# Patient Record
Sex: FEMALE | Race: BLACK OR AFRICAN AMERICAN | Employment: FULL TIME | URBAN - METROPOLITAN AREA
[De-identification: names, ages, dates, MRNs, and addresses within clinical notes are randomized per-mention and may not be internally consistent; named-entity substitution may affect disease eponyms.]

---

## 2017-01-11 ENCOUNTER — HOSPITAL ENCOUNTER (EMERGENCY)
Age: 57
Discharge: HOME | End: 2017-01-11
Attending: FAMILY MEDICINE

## 2017-01-11 VITALS
BODY MASS INDEX: 42.91 KG/M2 | SYSTOLIC BLOOD PRESSURE: 138 MMHG | HEART RATE: 85 BPM | DIASTOLIC BLOOD PRESSURE: 92 MMHG | WEIGHT: 227.3 LBS | HEIGHT: 61 IN | OXYGEN SATURATION: 97 % | RESPIRATION RATE: 16 BRPM | TEMPERATURE: 98.3 F

## 2017-01-11 DIAGNOSIS — J06.9 VIRAL URI WITH COUGH: Primary | ICD-10-CM

## 2017-01-11 RX ORDER — BENZONATATE 200 MG/1
200 CAPSULE ORAL
Qty: 21 CAP | Refills: 0 | Status: SHIPPED | OUTPATIENT
Start: 2017-01-11 | End: 2017-01-18

## 2017-01-11 RX ORDER — FLUTICASONE PROPIONATE 50 MCG
2 SPRAY, SUSPENSION (ML) NASAL DAILY
Qty: 1 BOTTLE | Refills: 0 | Status: SHIPPED | OUTPATIENT
Start: 2017-01-11 | End: 2017-05-02 | Stop reason: ALTCHOICE

## 2017-01-11 RX ORDER — BROMPHENIRAMINE MALEATE, PSEUDOEPHEDRINE HYDROCHLORIDE, AND DEXTROMETHORPHAN HYDROBROMIDE 2; 30; 10 MG/5ML; MG/5ML; MG/5ML
10 SYRUP ORAL
Qty: 240 ML | Refills: 0 | Status: SHIPPED | OUTPATIENT
Start: 2017-01-11 | End: 2017-05-02 | Stop reason: ALTCHOICE

## 2017-01-11 NOTE — DISCHARGE INSTRUCTIONS
Saline Nasal Washes: Care Instructions  Your Care Instructions  Saline nasal washes help keep the nasal passages open by washing out thick or dried mucus. This simple remedy can help relieve symptoms of allergies, sinusitis, and colds. It also can make the nose feel more comfortable by keeping the mucous membranes moist. You may notice a little burning sensation in your nose the first few times you use the solution, but this usually gets better in a few days. Follow-up care is a key part of your treatment and safety. Be sure to make and go to all appointments, and call your doctor if you are having problems. It's also a good idea to know your test results and keep a list of the medicines you take. How can you care for yourself at home? · You can buy premixed saline solution in a squeeze bottle or other sinus rinse products at a drugstore. Read and follow the instructions on the label. · You also can make your own saline solution by adding 1 teaspoon of salt and 1 teaspoon of baking soda to 2 cups of distilled water. · If you use a homemade solution, pour a small amount into a clean bowl. Using a rubber bulb syringe, squeeze the syringe and place the tip in the salt water. Pull a small amount of the salt water into the syringe by relaxing your hand. · Sit down with your head tilted slightly back. Do not lie down. Put the tip of the bulb syringe or the squeeze bottle a little way into one of your nostrils. Gently drip or squirt a few drops into the nostril. Repeat with the other nostril. Some sneezing and gagging are normal at first.  · Gently blow your nose. · Wipe the syringe or bottle tip clean after each use. · Repeat this 2 or 3 times a day. · Use nasal washes gently if you have nosebleeds often. When should you call for help? Watch closely for changes in your health, and be sure to contact your doctor if:  · You often get nosebleeds. · You have problems doing the nasal washes.   Where can you learn more? Go to http://evette-delta.info/. Enter [phone] in the search box to learn more about \"Saline Nasal Washes: Care Instructions. \"  Current as of: July 29, 2016  Content Version: 11.1  © 2282-2736 [employer], [employer]. Care instructions adapted under license by [employer] (which disclaims liability or warranty for this information). If you have questions about a medical condition or this instruction, always ask your healthcare professional. [address] any warranty or liability for your use of this information.

## 2017-01-11 NOTE — UC PROVIDER NOTE
Patient is a 62 y.o. female presenting with sinus pain. The history is provided by the patient. Sinus Pain    This is a new problem. The current episode started 2 days ago. The problem has not changed since onset. There has been no fever. Associated symptoms include congestion, sore throat and cough. Pertinent negatives include no chills. She has tried nothing for the symptoms.         Past Medical History   Diagnosis Date    Adrenal nodule ([address] 12/07/2014     NO TREATMENT NEEDED    Adverse effect of anesthesia 10/03/2016     \"TEND TO TAKE A LITTLE LONGER FOR EFFECT TO WEAR OFF\"    Adverse effect of anesthesia      NO KNOWN HISTORY OF ANESTHESIA PROBLEMS IN FAMILY    Arthritis     Bursitis of left hip     Chronic pain      HIP, KNEES    Common migraine 05/11/2010     PT DENIES THIS DIAGNOSIS ON 10/3/16    CRP elevated 5/11/2010    Diverticulosis of sigmoid 05/2010     HAS HAD 2-3 EPISODES OF DIVERTICULITIS, PT STATES ON 10/3/16    Eczema 5/11/2010    Elevated cholesterol 5/11/2010     elevated particle number    Family history of early CAD 5/11/2010    Fe deficiency anemia 5/11/2010    H/O mammogram 7/12/2012     [facility]    HTN (hypertension) 5/11/2010    Leukopenia     Vitamin D deficiency 8/6/2010        Past Surgical History   Procedure Laterality Date    Hx hysterectomy  8/98     ovaries spared    Endoscopy, colon, diagnostic  2/25/11    Hx cholecystectomy  11/01     laparoscopic    Hx hernia repair  6114     UMBILICAL    Hx orthopaedic Right 2003     BUNIONECTOMY    Hx orthopaedic Left 02/2016     TOTAL HIP         Family History   Problem Relation Age of Onset    Colon Cancer Paternal Grandmother     Cancer Paternal Grandmother     Colon Cancer Paternal Grandfather     Arthritis-osteo Mother     Diabetes Mother     Elevated Lipids Mother     Hypertension Mother     Diabetes Father     Arthritis-osteo Father     Anesth Problems Neg Hx         Social History Social History    Marital status:      Spouse name: N/A    Number of children: 2    Years of education: N/A     Occupational History    Clerical      Social History Main Topics    Smoking status: Never Smoker    Smokeless tobacco: Never Used    Alcohol use Yes      Comment: rare    Drug use: No    Sexual activity: Yes     Partners: Male     Birth control/ protection: None     Other Topics Concern    Not on file     Social History Narrative                ALLERGIES: Latex    Review of Systems   Constitutional: Negative for chills. HENT: Positive for congestion and sore throat. Respiratory: Positive for cough. Vitals:    01/11/17 1215   BP: (!) 138/92   Pulse: 85   Resp: 16   Temp: 98.3 °F (36.8 °C)   SpO2: 97%   Weight: 103.1 kg (227 lb 4.8 oz)   Height: 5' 1\" (1.549 m)       Physical Exam   Constitutional: No distress. HENT:   Right Ear: Tympanic membrane and ear canal normal.   Left Ear: Tympanic membrane and ear canal normal.   Nose: Nose normal.   Mouth/Throat: No oropharyngeal exudate, posterior oropharyngeal edema or posterior oropharyngeal erythema. Eyes: Conjunctivae are normal. Right eye exhibits no discharge. Left eye exhibits no discharge. Neck: Neck supple. Pulmonary/Chest: Effort normal and breath sounds normal. No respiratory distress. She has no wheezes. She has no rales. Lymphadenopathy:     She has no cervical adenopathy. Skin: No rash noted. Nursing note and vitals reviewed.       MDM     Differential Diagnosis; Clinical Impression; Plan:     CLINICAL IMPRESSION:  Viral URI with cough  (primary encounter diagnosis)      DDX    Plan:    Fluids/ gargles  Claritin/ allegra   Tylenol cold-sinus - max strength 1-2 tab 4 times/ day    with Advil as needed    bromfed DM/ flonase and tessalon  Amount and/or Complexity of Data Reviewed:    Review and summarize past medical records:  Yes  Risk of Significant Complications, Morbidity, and/or Mortality:   Presenting problems:  Low  Management options:  Low  Progress:   Patient progress:  Stable      Procedures

## 2017-01-12 ENCOUNTER — PATIENT OUTREACH (OUTPATIENT)
Dept: FAMILY MEDICINE CLINIC | Age: 57
End: 2017-01-12

## 2017-01-12 NOTE — PROGRESS NOTES
Per [facility], [facility] report, patient seen at [facility] 1/11/17 for viral URI with cough. Last ov 9/12/16. Letter sent to patient to f/u if no better and urged to schedule regular check ups for HM. NN contact information provided.

## 2017-01-12 NOTE — LETTER
January 12, 2017 [address] [facility] [address] [place] [id number] Dear Dejuan Paez: Thank you for requesting access to [email or web address]. Please follow the instructions below to securely access and download your online medical record. [email or web address] allows you to send messages to your doctor, view your test results, renew your prescriptions, schedule appointments, and more. How Do I Sign Up? 1. In your internet browser, go to [email or web address] 2. Click on the First Time User? Click Here link in the Sign In box. You will see the New Member Sign Up page. 3. Enter your [email or web address] Access Code exactly as it appears below. You will not need to use this code after youve completed the sign-up process. If you do not sign up before the expiration date, you must request a new code. [email or web address] Access Code: [email or web address] Expires: 4/11/2017 12:04 PM  
 
4. Enter the last four digits of your Social Security Number (xxxx) and Date of Birth (mm/dd/yyyy) as indicated and click Submit. You will be taken to the next sign-up page. 5. Create a [email or web address] ID. This will be your [email or web address] login ID and cannot be changed, so think of one that is secure and easy to remember. 6. Create a [email or web address] password. You can change your password at any time. 7. Enter your Password Reset Question and Answer. This can be used at a later time if you forget your password. 8. Enter your e-mail address. You will receive e-mail notification when new information is available in [address]. 9. Click Sign Up. You can now view and download portions of your medical record. 10. Click the Download Summary menu link to download a portable copy of your medical information. Additional Information If you have questions, please visit the Frequently Asked Questions section of the [email or web address] website at [email or web address] Remember, [email or web address] is NOT to be used for urgent needs. For medical emergencies, dial 911. Now available from your iPhone and Android! Sincerely, [employer]

## 2017-01-12 NOTE — LETTER
1/12/2017 8:05 AM 
 
[facility] [address] [address] Dear Eleonora Churchill, 
 
I am a Nurse Navigator working with your physician's office. Part of my job is to follow up with patients who have been in the emergency department,urgent care or hospital to see how they are feeling, answer any questions they may have about their visit and also make sure they have a follow-up appointment to see their primary care doctor. I see that you went to the [address] [place] [address] yesterday. I hope that you are feeling better by now. If not, please call and schedule an appointment for follow up with one of our providers. Also, please schedule routine check ups to make sure all of your Health Maintenance issues are up to date. I can also provide resources to patients that have other needs. Please call me if you need assistance with affording food, medications, or if you need transportation to physician appointments. Also, I have enclosed information about \"My Chart,\" which allows you to access your medical record from home. If you would be interested in this feature I can activate it for you. I can be reached Monday thru Friday at the telephone number listed above. Thank you for allowing us to participate in your care!  
 
 
 
 
Sincerely, 
 
 
Lavern Berry RN

## 2017-05-02 ENCOUNTER — OFFICE VISIT (OUTPATIENT)
Dept: FAMILY MEDICINE CLINIC | Age: 57
End: 2017-05-02

## 2017-05-02 VITALS
SYSTOLIC BLOOD PRESSURE: 140 MMHG | HEART RATE: 77 BPM | DIASTOLIC BLOOD PRESSURE: 82 MMHG | WEIGHT: 225.8 LBS | RESPIRATION RATE: 18 BRPM | BODY MASS INDEX: 42.63 KG/M2 | HEIGHT: 61 IN | TEMPERATURE: 97.7 F | OXYGEN SATURATION: 97 %

## 2017-05-02 DIAGNOSIS — E55.9 VITAMIN D DEFICIENCY: ICD-10-CM

## 2017-05-02 DIAGNOSIS — Z00.00 PHYSICAL EXAM, ANNUAL: Primary | ICD-10-CM

## 2017-05-02 DIAGNOSIS — E78.00 ELEVATED CHOLESTEROL: ICD-10-CM

## 2017-05-02 DIAGNOSIS — E11.9 TYPE 2 DIABETES MELLITUS WITHOUT COMPLICATION, WITHOUT LONG-TERM CURRENT USE OF INSULIN (HCC): ICD-10-CM

## 2017-05-02 DIAGNOSIS — Z11.59 NEED FOR HEPATITIS C SCREENING TEST: ICD-10-CM

## 2017-05-02 DIAGNOSIS — S72.92XD CLOSED FRACTURE OF LEFT FEMUR WITH ROUTINE HEALING, UNSPECIFIED FRACTURE MORPHOLOGY, UNSPECIFIED PORTION OF FEMUR, SUBSEQUENT ENCOUNTER: ICD-10-CM

## 2017-05-02 RX ORDER — METFORMIN HYDROCHLORIDE 500 MG/1
500 TABLET ORAL 2 TIMES DAILY WITH MEALS
Qty: 30 TAB | Refills: 6 | Status: SHIPPED | OUTPATIENT
Start: 2017-05-02 | End: 2017-05-03

## 2017-05-02 NOTE — PATIENT INSTRUCTIONS
1) Healthy BMI is between 20% -24.9%. This is a mathematical calculation based on your height and weight. Your BMI is 42    In terms of weight loss, a 5-10% reduction in body weight over 3-6 months is a reasonable goal.  There would likely be improvements in risk for disease such as diabetes and heart disease, with this amount of weight loss. Reduce your daily intake of calories by 500 with a combination of decrease portions of food and increase exercise to help reduce weight. The recommended serving size for protein is a 2-3 oz serving (the size of your fist), and 1-1.5 oz of carbohydrate per meal (about 1 cup). Increase servings of fruits and vegetables. Drink plenty of water. Choose lean meats for protein source which include chicken, pork, and turkey. Limit processed carbohydrates, (i.e. most breads, crackers, pasta, chips, rice, breaded or battered food, etc). Whole wheat, (instead of white) is a healthier option for bread, rice, and crackers. Limit sugar and alcohol. Daily exercise will also help with weight loss and overall health. A minimum of 150 minutes a week of moderate exercise is recommended. Weight loss will help with diabetes control and reduce stress on joints. You may wish to consider seeing the nutritionist at [facility] ([phone]), [facility] ([phone]) or [place] ([phone]). For reliable dietary information, go to www. EATRIGHT.org.    Free smart phone application to help manage weight loss: MyFitnessPal = tracks food intake, exercise and weight. Daily nutritional summary. Meal      2) Metformin    You have been prescribed metformin to help with your blood sugar levels. The most common side effect from metformin is gastrointestinal symptoms (such as diarrhea, belly pain, nausea). To minimize side effects, please take with food. Metformin titration:  Begin Metformin 500 mg at dinner and take for 1 week.   If no GI side effects (nausea, diarrhea, belly pain), go up to 500 mg with breakfast and dinner for 1 week. 3rd week: take 1pills (500 mg) in the morning and take 2 pill (500 mg)at night  4th week: take 2 pills (500 mg) in the morning and take 2 pills (500 mg)at night    3) Try using compression hose to help with feet swelling.

## 2017-05-02 NOTE — MR AVS SNAPSHOT
Visit Information Date & Time Provider Department Dept. Phone Encounter #  
 5/2/2017  9:00 AM Graham Aragon  [facility] [address] [phone] [id number] Upcoming Health Maintenance Date Due Hepatitis C Screening 1960 Pneumococcal 19-64 Highest Risk (1 of 3 - PCV13) 1/2/1979 PAP AKA CERVICAL CYTOLOGY 8/20/2017 BREAST CANCER SCRN MAMMOGRAM 8/25/2017 INFLUENZA AGE 9 TO ADULT 8/1/2017 COLONOSCOPY 2/25/2021 DTaP/Tdap/Td series (2 - Td) 4/17/2024 Allergies as of 5/2/2017  Review Complete On: 5/2/2017 By: Graham Aragon NP Severity Noted Reaction Type Reactions Latex  04/17/2014   Systemic Itching ITCHING, SWELLING Current Immunizations  Reviewed on 10/25/2016 Name Date Influenza Vaccine (Quad) PF 10/26/2016  2:07 PM  
 Influenza Vaccine Split 11/9/2010 Tdap 4/17/2014 Not reviewed this visit You Were Diagnosed With   
  
 Codes Comments Physical exam, annual    -  Primary ICD-10-CM: Z00.00 ICD-9-CM: V70.0 Elevated cholesterol     ICD-10-CM: E78.00 ICD-9-CM: 272.0 Vitamin D deficiency     ICD-10-CM: E55.9 ICD-9-CM: 268.9 Need for hepatitis C screening test     ICD-10-CM: Z11.59 
ICD-9-CM: V73.89 Hip fracture requiring operative repair, left, closed, initial encounter     ICD-10-CM: S72.002A ICD-9-CM: 820.8 Vitals BP Pulse Temp Resp Height(growth percentile) Weight(growth percentile) 140/82 (BP 1 Location: Left arm, BP Patient Position: Sitting) 77 97.7 °F (36.5 °C) (Oral) 18 5' 1\" (1.549 m) 225 lb 12.8 oz (102.4 kg) SpO2 BMI OB Status Smoking Status 97% 42.66 kg/m2 Hysterectomy Never Smoker BMI and BSA Data Body Mass Index Body Surface Area  
 42.66 kg/m 2 2.1 m 2 Preferred Pharmacy Pharmacy Name Phone [id number] [address] [phone] Your Updated Medication List  
 This list is accurate as of: 5/2/17 10:11 AM.  Always use your most recent med list.  
  
  
  
  
 acetaminophen 500 mg tablet Commonly known as:  TYLENOL Take 2 Tabs by mouth every six (6) hours. atorvastatin 40 mg tablet Commonly known as:  LIPITOR  
TAKE 1 TAB BY MOUTH DAILY. Brompheniramine-Pseudoeph-DM 2-30-10 mg/5 mL syrup Commonly known as:  BROMFED DM Take 10 mL by mouth four (4) times daily as needed. cholecalciferol 1,000 unit Cap Commonly known as:  VITAMIN D3 Take  by mouth. diclofenac EC 75 mg EC tablet Commonly known as:  VOLTAREN Take  by mouth two (2) times a day. ferrous sulfate 325 mg (65 mg iron) tablet Take 325 mg by mouth Daily (before breakfast). fluticasone 50 mcg/actuation nasal spray Commonly known as:  Skipper Or 2 Sprays by Both Nostrils route daily. lisinopril-hydroCHLOROthiazide 20-12.5 mg per tablet Commonly known as:  PRINZIDE, ZESTORETIC  
TAKE 1 TABLET BY MOUTH EVERY DAY  
  
 MULTI-VITAMIN PO Take 1 Tab by mouth daily. ONE A DAY VITAMIN We Performed the Following AMB POC EKG ROUTINE W/ 12 LEADS, INTER & REP [89955 CPT(R)] AMB POC HEMOGLOBIN A1C [42630 CPT(R)] CBC W/O DIFF [28865 CPT(R)] HEPATITIS C AB [28303 CPT(R)] LIPID PANEL [18712 CPT(R)] METABOLIC PANEL, COMPREHENSIVE [11718 CPT(R)] TSH 3RD GENERATION [29902 CPT(R)] VITAMIN D, 25 HYDROXY [id number] CPT(R)] To-Do List   
 08/02/2017 Imaging:  DEXA BONE DENSITY STUDY AXIAL Referral Information Referral ID Referred By Referred To  
  
 [id number] Paula VALENZUELA Not Available Visits Status Start Date End Date 1 New Request 5/2/17 5/2/18 If your referral has a status of pending review or denied, additional information will be sent to support the outcome of this decision. Patient Instructions 1) Healthy BMI is between 20% -24.9%.   This is a mathematical calculation based on your height and weight. Your BMI is 42 In terms of weight loss, a 5-10% reduction in body weight over 3-6 months is a reasonable goal.  There would likely be improvements in risk for disease such as diabetes and heart disease, with this amount of weight loss. Reduce your daily intake of calories by 500 with a combination of decrease portions of food and increase exercise to help reduce weight. The recommended serving size for protein is a 2-3 oz serving (the size of your fist), and 1-1.5 oz of carbohydrate per meal (about 1 cup). Increase servings of fruits and vegetables. Drink plenty of water. Choose lean meats for protein source which include chicken, pork, and turkey. Limit processed carbohydrates, (i.e. most breads, crackers, pasta, chips, rice, breaded or battered food, etc). Whole wheat, (instead of white) is a healthier option for bread, rice, and crackers. Limit sugar and alcohol. Daily exercise will also help with weight loss and overall health. A minimum of 150 minutes a week of moderate exercise is recommended. Weight loss will help with diabetes control and reduce stress on joints. You may wish to consider seeing the nutritionist at [facility] ([phone]), [facility] ([phone]) or [place] ([phone]). For reliable dietary information, go to www. EATRIGHT.org. 
 
Free smart phone application to help manage weight loss: MyFitnessPal = tracks food intake, exercise and weight. Daily nutritional summary. Meal  2) Metformin You have been prescribed metformin to help with your blood sugar levels. The most common side effect from metformin is gastrointestinal symptoms (such as diarrhea, belly pain, nausea). To minimize side effects, please take with food. Metformin titration: 
Begin Metformin 500 mg at dinner and take for 1 week.   If no GI side effects (nausea, diarrhea, belly pain), go up to 500 mg with breakfast and dinner for 1 week. 3rd week: take 1pills (500 mg) in the morning and take 2 pill (500 mg)at night 
4th week: take 2 pills (500 mg) in the morning and take 2 pills (500 mg)at night 3) Try using compression hose to help with feet swelling. Introducing [facility]! [employer] introduces Impactia patient portal. Now you can access parts of your medical record, email your doctor's office, and request medication refills online. 1. In your internet browser, go to [email or web address] 2. Click on the First Time User? Click Here link in the Sign In box. You will see the New Member Sign Up page. 3. Enter your Impactia Access Code exactly as it appears below. You will not need to use this code after youve completed the sign-up process. If you do not sign up before the expiration date, you must request a new code. · Impactia Access Code: [email or web address] Expires: 7/31/2017 10:01 AM 
 
4. Enter the last four digits of your Social Security Number (xxxx) and Date of Birth (mm/dd/yyyy) as indicated and click Submit. You will be taken to the next sign-up page. 5. Create a Impactia ID. This will be your Impactia login ID and cannot be changed, so think of one that is secure and easy to remember. 6. Create a Impactia password. You can change your password at any time. 7. Enter your Password Reset Question and Answer. This can be used at a later time if you forget your password. 8. Enter your e-mail address. You will receive e-mail notification when new information is available in [address] 9. Click Sign Up. You can now view and download portions of your medical record. 10. Click the Download Summary menu link to download a portable copy of your medical information.  
 
If you have questions, please visit the Frequently Asked Questions section of the MideoMe. Remember, fitmobhart is NOT to be used for urgent needs. For medical emergencies, dial 911. Now available from your iPhone and Android! Please provide this summary of care documentation to your next provider. Your primary care clinician is listed as Danielle Crawford. If you have any questions after today's visit, please call [phone].

## 2017-05-02 NOTE — PROGRESS NOTES
1. Have you been to the ER, urgent care clinic since your last visit? Hospitalized since your last visit? No    2. Have you seen or consulted any other health care providers outside of the [address] [place] [address] since your last visit? Include any pap smears or colon screening.  No     Chief Complaint   Patient presents with    Complete Physical     patient is here for annual exam      Learning Assessment 2/12/2013   PRIMARY LEARNER Patient   HIGHEST LEVEL OF EDUCATION - PRIMARY LEARNER  GRADUATED HIGH SCHOOL OR GED   PRIMARY LANGUAGE ENGLISH   LEARNER PREFERENCE PRIMARY DEMONSTRATION     LISTENING   ANSWERED BY patient   RELATIONSHIP SELF

## 2017-05-02 NOTE — PROGRESS NOTES
CCP: Complete Physical    S: Anirudh Judge is a 62 y.o. female who presents for complete physical    HPI:  2/2016 - left hip replacement; femur fractured after surgery and had to have a metal mina put into femur; has numbness still on upper left leg  10/2016 - right knee (full) replacement - went ok  She does have aches and pains in knee and hip. She walks a lot at work and at end of day she is sore. No other issues concerning pt today    Anirudh Judge has lost 2lbs since last visit on 1/11/17. Social history:   Nutrition: relatively healthy - has not had any diabetes counseling  breakfast - nothing  Lunch - left overs from dinner - meat and veggie, starch, sometimes fruit  Dinner: meat, veggie, starch, dessert - ice cream (favorite) cookie, cake, pie  Drinks: Pepsi, water  Physical: none - will try and walk with  daily  Social: lives with , step-son   Occupation: [employer] -   Tobacco: non-smoker  Drug: none  Alcohol: occasionally wine  Sexually Active: yes with ;     Review of Systems:  - Constitutional Symptoms: no fevers or chills  - Eyes: no blurry vision or double vision  - Cardiovascular: no chest pain or palpitations  - Respiratory: no cough or shortness of breath  - Gastrointestinal: no dysphagia or abdominal pain  - Musculoskeletal: + joint pains (R knee, Left hip), no weakness  - Integumentary: no rashes  - : no vaginal discharge, itching, dyspareunia. PAP at gyn  - Neurological: no numbness, tingling, or headaches  - Psychiatric: no depression or anxiety  - Endocrine:  no heat or cold intolerance, no polyuria or polydipsia    No LMP recorded. Patient has had a hysterectomy.   1998       Past Medical History:   Diagnosis Date    Adrenal nodule ([facility] [address] 12/07/2014    NO TREATMENT NEEDED    Adverse effect of anesthesia 10/03/2016    \"TEND TO TAKE A LITTLE LONGER FOR EFFECT TO WEAR OFF\"    Adverse effect of anesthesia     NO KNOWN HISTORY OF ANESTHESIA PROBLEMS IN FAMILY    Arthritis     Bursitis of left hip     Chronic pain     HIP, KNEES    Common migraine 05/11/2010    PT DENIES THIS DIAGNOSIS ON 10/3/16    CRP elevated 5/11/2010    Diverticulosis of sigmoid 05/2010    HAS HAD 2-3 EPISODES OF DIVERTICULITIS, PT STATES ON 10/3/16    Eczema 5/11/2010    Elevated cholesterol 5/11/2010    elevated particle number    Family history of early CAD 5/11/2010    Fe deficiency anemia 5/11/2010    H/O mamm[facility] 7/12/2012    [facility]    HTN (hypertension) 5/11/2010    Leukopenia     Vitamin D deficiency 8/6/2010       Current Outpatient Prescriptions   Medication Sig Dispense Refill    lisinopril-hydrochlorothiazide (PRINZIDE, ZESTORETIC) 20-12.5 mg per tablet TAKE 1 TABLET BY MOUTH EVERY DAY 90 Tab 2    atorvastatin (LIPITOR) 40 mg tablet TAKE 1 TAB BY MOUTH DAILY. 90 Tab 3    ferrous sulfate 325 mg (65 mg iron) tablet Take 325 mg by mouth Daily (before breakfast).  MULTI-VITAMIN PO Take 1 Tab by mouth daily. ONE A DAY VITAMIN      Brompheniramine-Pseudoeph-DM (BROMFED DM) 2-30-10 mg/5 mL syrup Take 10 mL by mouth four (4) times daily as needed. 240 mL 0    fluticasone (FLONASE) 50 mcg/actuation nasal spray 2 Sprays by Both Nostrils route daily. 1 Bottle 0    acetaminophen (TYLENOL) 500 mg tablet Take 2 Tabs by mouth every six (6) hours. 90 Tab 0    diclofenac EC (VOLTAREN) 75 mg EC tablet Take  by mouth two (2) times a day.  Cholecalciferol, Vitamin D3, 1,000 unit cap Take  by mouth. Pt is taking all medications as prescribed without any side effects or difficulty.     Allergies   Allergen Reactions    Latex Itching     ITCHING, SWELLING        Health Maintenance:  Colonoscopy: 2/25/2011 - 10 year recall  Mammogram: 8/3/2016  PAP: 8/20/2014 - will schedule this year  DEXA: ordered  Eye exam: UTD 2016 - wears reading glasses  Tdap: 4/17/2014  Flu: 10/2016    O: VS:   Visit Vitals    /82 (BP 1 Location: Left arm, BP Patient Position: Sitting)    Pulse 77    Temp 97.7 °F (36.5 °C) (Oral)    Resp 18    Ht 5' 1\" (1.549 m)    Wt 225 lb 12.8 oz (102.4 kg)    SpO2 97%    BMI 42.66 kg/m2       Data Reviewed:  Labs:   TSH: 3.24 (2014)  Cholesterol: 145 (6/12/15)   Tri: 123   HDL: 53   LDL: 67  HbA1C: 6.5 % (10/3/16)  Vitamin D: 20 ()    EK2016 - Sinus rhythm with fusion complexes   Minimal voltage criteria for LVH, may be normal variant     GENERAL: Edy Conley is sitting on exam table in no acute distress. Non-toxic. Well nourished. Well developed. Appropriately groomed. HEAD:  Normocephalic. Atraumatic. Non tender sinuses x 4. EYE: PERRLA. EOMs intact. Sclera anicteric without injection. No drainage or discharge. EARS: Hearing intact bilaterally. External ear canals normal without evidence of blood or swelling. Right ear has cerumen. Bilateral TM's intact, pearly grey with landmarks visible. No erythema or effusion. NOSE: Patent. Nasal turbinates pink. No polyps noted. No erythema. No discharge. MOUTH: mucous membranes pink and moist. Posterior pharynx normal with cobblestone appearance. No erythema, white exudate or obstruction. NECK: supple. Midline trachea. No thyromegaly noted. RESP: Breath sounds are symmetrical bilaterally. Unlabored without SOB. Speaking in full sentences. Clear to auscultation bilaterally anteriorly and posteriorly. No wheezes. No rales or rhonchi. CV: normal rate. Regular rhythm. S1, S2 audible. No murmur noted. No rubs, clicks or gallops noted. ABDOMEN: Round without bulges or pulsations. Soft and nondistended. No tenderness on palpation. No masses or organomegaly. No rebound, rigidity or guarding. Bowel sounds normal x 4 quadrants. BACK: No visible deformities or curvature. Full ROM. No pain on palpation of the spinous processes in the cervical, thoracic, lumbar, sacral regions. No CVA tenderness.    NEURO:  awake, alert and oriented to person, place, and time and event. Cranial nerves II through XII intact. Clear speech. Sensation is intact to light touch bilaterally. Steady gait. MUSC:  Intact x 4 extremities. Full ROM x 4 extremities. Muscle strength is +5/5  upper extremities; +4/5 lower extremities. Slight edema bilateral feet  HEME/LYMPH: peripheral pulses palpable 2+ x 4 extremities. No peripheral edema is noted. No cervical adenopathy noted. SKIN: Skin is warm and dry. Turgor is normal. No petechiae, no purpura, no rash. No cyanosis. No mottling, jaundice or pallor. PSYCH: appropriate behavior, dress and thought processes. Good eye contact. Clear and coherent speech. Full affect. Good insight. Assessment and Plan:  1. Physical exam, annual  Discussed diet and weight loss, including dietician consult. - AMB POC EKG ROUTINE W/ 12 LEADS, INTER & REP  - CBC W/O DIFF  - LIPID PANEL  - METABOLIC PANEL, COMPREHENSIVE  - TSH 3RD GENERATION  - AMB POC HEMOGLOBIN A1C  - DEXA BONE DENSITY STUDY AXIAL; Future    2. Elevated cholesterol  Currently stable with medication. Medication was maintained, pending lab results. 3. Vitamin D deficiency  Pt takes multivitamin. Discussed Calcium and Vit D supplements, pending lab results  - VITAMIN D, 25 HYDROXY    4. Need for hepatitis C screening test  - HEPATITIS C AB    5. Closed fracture of left femur with routine healing, unspecified fracture morphology, unspecified portion of femur, subsequent encounter  Pt had left hip replacement 2/2016 and subsequent femur fracture. Will check bone density.  - DEXA BONE DENSITY STUDY AXIAL; Future    6. Type II Diabetes Mellitus  A1C of 6.5% (10/3/2016), up from [facility] of 6.4% on 2/4/2016. Will start metformin 500 mg daily to help with BS control and weight loss, may titrate up depending on results from today's labs. Patient education was done. Advised on nutrition, physical activity, weight management, tobacco, alcohol and safety.   Counseling included discussion of diagnosis, differentials, treatment options, prescribed treatment, warning signs and follow up. Medication risks/benefits,interactions and alternatives discussed with patient.      Patient verbalized understanding and agreed to plan of care. Patient was given an after visit summary which included current diagnoses, medications and vital signs. Follow up in 3 months    Patient Instructions   1) Healthy BMI is between 20% -24.9%. This is a mathematical calculation based on your height and weight. Your BMI is 42    In terms of weight loss, a 5-10% reduction in body weight over 3-6 months is a reasonable goal.  There would likely be improvements in risk for disease such as diabetes and heart disease, with this amount of weight loss. Reduce your daily intake of calories by 500 with a combination of decrease portions of food and increase exercise to help reduce weight. The recommended serving size for protein is a 2-3 oz serving (the size of your fist), and 1-1.5 oz of carbohydrate per meal (about 1 cup). Increase servings of fruits and vegetables. Drink plenty of water. Choose lean meats for protein source which include chicken, pork, and turkey. Limit processed carbohydrates, (i.e. most breads, crackers, pasta, chips, rice, breaded or battered food, etc). Whole wheat, (instead of white) is a healthier option for bread, rice, and crackers. Limit sugar and alcohol. Daily exercise will also help with weight loss and overall health. A minimum of 150 minutes a week of moderate exercise is recommended. Weight loss will help with diabetes control and reduce stress on joints. You may wish to consider seeing the nutritionist at [facility] ([phone]), [facility] ([phone]) or [facility] ([phone]). For reliable dietary information, go to www. EATRIGHT.org.    Free smart phone application to help manage weight loss:   MyFitnessPal = tracks food intake, exercise and weight. Daily nutritional summary. Meal      2) Metformin    You have been prescribed metformin to help with your blood sugar levels. The most common side effect from metformin is gastrointestinal symptoms (such as diarrhea, belly pain, nausea). To minimize side effects, please take with food. Metformin titration:  Begin Metformin 500 mg at dinner and take for 1 week. If no GI side effects (nausea, diarrhea, belly pain), go up to 500 mg with breakfast and dinner for 1 week. 3rd week: take 1pills (500 mg) in the morning and take 2 pill (500 mg)at night  4th week: take 2 pills (500 mg) in the morning and take 2 pills (500 mg)at night    3) Try using compression hose to help with feet swelling.

## 2017-05-03 LAB
25(OH)D3+25(OH)D2 SERPL-MCNC: 22.5 NG/ML (ref 30–100)
ALBUMIN SERPL-MCNC: 4 G/DL (ref 3.5–5.5)
ALBUMIN/GLOB SERPL: 1.3 {RATIO} (ref 1.2–2.2)
ALP SERPL-CCNC: 96 IU/L (ref 39–117)
ALT SERPL-CCNC: 23 IU/L (ref 0–32)
AST SERPL-CCNC: 22 IU/L (ref 0–40)
BILIRUB SERPL-MCNC: 0.4 MG/DL (ref 0–1.2)
BUN SERPL-MCNC: 11 MG/DL (ref 6–24)
BUN/CREAT SERPL: 19 (ref 9–23)
CALCIUM SERPL-MCNC: 8.9 MG/DL (ref 8.7–10.2)
CHLORIDE SERPL-SCNC: 100 MMOL/L (ref 96–106)
CHOLEST SERPL-MCNC: 142 MG/DL (ref 100–199)
CO2 SERPL-SCNC: 22 MMOL/L (ref 18–29)
CREAT SERPL-MCNC: 0.59 MG/DL (ref 0.57–1)
ERYTHROCYTE [DISTWIDTH] IN BLOOD BY AUTOMATED COUNT: 15 % (ref 12.3–15.4)
EST. AVERAGE GLUCOSE BLD GHB EST-MCNC: 137 MG/DL
GLOBULIN SER CALC-MCNC: 3.2 G/DL (ref 1.5–4.5)
GLUCOSE SERPL-MCNC: 110 MG/DL (ref 65–99)
HBA1C MFR BLD: 6.4 % (ref 4.8–5.6)
HCT VFR BLD AUTO: 36.7 % (ref 34–46.6)
HCV AB S/CO SERPL IA: <0.1 S/CO RATIO (ref 0–0.9)
HDLC SERPL-MCNC: 52 MG/DL
HGB BLD-MCNC: 12.4 G/DL (ref 11.1–15.9)
INTERPRETATION, 910389: NORMAL
LDLC SERPL CALC-MCNC: 68 MG/DL (ref 0–99)
MCH RBC QN AUTO: 28.6 PG (ref 26.6–33)
MCHC RBC AUTO-ENTMCNC: 33.8 G/DL (ref 31.5–35.7)
MCV RBC AUTO: 85 FL (ref 79–97)
PLATELET # BLD AUTO: 232 X10E3/UL (ref 150–379)
POTASSIUM SERPL-SCNC: 3.7 MMOL/L (ref 3.5–5.2)
PROT SERPL-MCNC: 7.2 G/DL (ref 6–8.5)
RBC # BLD AUTO: 4.34 X10E6/UL (ref 3.77–5.28)
SODIUM SERPL-SCNC: 140 MMOL/L (ref 134–144)
TRIGL SERPL-MCNC: 111 MG/DL (ref 0–149)
TSH SERPL DL<=0.005 MIU/L-ACNC: 2.18 UIU/ML (ref 0.45–4.5)
VLDLC SERPL CALC-MCNC: 22 MG/DL (ref 5–40)
WBC # BLD AUTO: 3.4 X10E3/UL (ref 3.4–10.8)

## 2017-05-25 ENCOUNTER — HOSPITAL ENCOUNTER (OUTPATIENT)
Dept: MAMMOGRAPHY | Age: 57
Discharge: HOME | End: 2017-05-25
Payer: COMMERCIAL

## 2017-05-25 DIAGNOSIS — S72.92XD CLOSED FRACTURE OF LEFT FEMUR WITH ROUTINE HEALING, UNSPECIFIED FRACTURE MORPHOLOGY, UNSPECIFIED PORTION OF FEMUR, SUBSEQUENT ENCOUNTER: ICD-10-CM

## 2017-05-25 DIAGNOSIS — Z00.00 PHYSICAL EXAM, ANNUAL: ICD-10-CM

## 2017-05-25 PROCEDURE — 77080 DXA BONE DENSITY AXIAL: CPT

## 2017-09-05 RX ORDER — LISINOPRIL AND HYDROCHLOROTHIAZIDE 12.5; 2 MG/1; MG/1
TABLET ORAL
Qty: 90 TAB | Refills: 2 | Status: SHIPPED | OUTPATIENT
Start: 2017-09-05 | End: 2017-11-29 | Stop reason: SDUPTHER

## 2017-10-16 RX ORDER — ATORVASTATIN CALCIUM 40 MG/1
TABLET, FILM COATED ORAL
Qty: 90 TAB | Refills: 0 | Status: SHIPPED | OUTPATIENT
Start: 2017-10-16 | End: 2018-01-13 | Stop reason: SDUPTHER

## 2017-11-29 ENCOUNTER — OFFICE VISIT (OUTPATIENT)
Dept: FAMILY MEDICINE CLINIC | Age: 57
End: 2017-11-29

## 2017-11-29 VITALS
SYSTOLIC BLOOD PRESSURE: 138 MMHG | BODY MASS INDEX: 43.27 KG/M2 | DIASTOLIC BLOOD PRESSURE: 89 MMHG | RESPIRATION RATE: 18 BRPM | HEART RATE: 68 BPM | WEIGHT: 229.2 LBS | HEIGHT: 61 IN | TEMPERATURE: 98 F | OXYGEN SATURATION: 98 %

## 2017-11-29 DIAGNOSIS — R73.02 GLUCOSE INTOLERANCE (IMPAIRED GLUCOSE TOLERANCE): ICD-10-CM

## 2017-11-29 DIAGNOSIS — E78.00 ELEVATED CHOLESTEROL: Primary | ICD-10-CM

## 2017-11-29 DIAGNOSIS — E66.01 OBESITY, MORBID (HCC): ICD-10-CM

## 2017-11-29 DIAGNOSIS — I10 HTN (HYPERTENSION), BENIGN: ICD-10-CM

## 2017-11-29 DIAGNOSIS — M25.842 CYST OF JOINT OF HAND, LEFT: ICD-10-CM

## 2017-11-29 DIAGNOSIS — Z23 ENCOUNTER FOR IMMUNIZATION: ICD-10-CM

## 2017-11-29 RX ORDER — LISINOPRIL AND HYDROCHLOROTHIAZIDE 12.5; 2 MG/1; MG/1
1 TABLET ORAL DAILY
Qty: 90 TAB | Refills: 1 | Status: SHIPPED | OUTPATIENT
Start: 2017-11-29 | End: 2019-02-14 | Stop reason: SDUPTHER

## 2017-11-29 NOTE — PATIENT INSTRUCTIONS

## 2017-11-29 NOTE — PROGRESS NOTES
1. Have you been to the ER, urgent care clinic since your last visit? Hospitalized since your last visit? No    2. Have you seen or consulted any other health care providers outside of the [address] [place] [address] since your last visit? Include any pap smears or colon screening. No     Chief Complaint   Patient presents with    Medication Evaluation     vicenten here for follow up for lisinopril    Finger Swelling     patient has a bump to pinky finger onleft hand,some discomfort       Learning assessment complete  Abuse Screening Questionnaire 11/29/2017   Do you ever feel afraid of your partner? N   Are you in a relationship with someone who physically or mentally threatens you? N   Is it safe for you to go home? Y     Fall Risk Assessment, last 12 mths 11/29/2017   Able to walk? Yes   Fall in past 12 months?  No

## 2017-11-29 NOTE — PROGRESS NOTES
HISTORY OF PRESENT ILLNESS  Brittni Cheney is a 62 y.o. female. HPI:Cardiovascular Review  The patient has, hypertension and hyperlipidemia and is prediabetic. She reports taking medications as instructed, no medication side effects noted. Diet and Lifestyle: not attempting to follow a low fat, low cholesterol diet, does not rigorously follow a diabetic diet, no formal exercise but active during the day. Lab review: labs reviewed and discussed with patient. Morbid obesity: she has morbid obesity,she is not able to exercise due to hip and knee pain, she had hip replacement surgery. 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 It is not painful, but it irritates her at times. she is due for flu shot.     Past Medical History:   Diagnosis Date    Adrenal nodule ([address] 12/07/2014    NO TREATMENT NEEDED    Adverse effect of anesthesia 10/03/2016    \"TEND TO TAKE A LITTLE LONGER FOR EFFECT TO WEAR OFF\"    Adverse effect of anesthesia     NO KNOWN HISTORY OF ANESTHESIA PROBLEMS IN FAMILY    Arthritis     Bursitis of left hip     Chronic pain     HIP, KNEES    Common migraine 05/11/2010    PT DENIES THIS DIAGNOSIS ON 10/3/16    CRP elevated 5/11/2010    Diverticulosis of sigmoid 05/2010    HAS HAD 2-3 EPISODES OF DIVERTICULITIS, PT STATES ON 10/3/16    Eczema 5/11/2010    Elevated cholesterol 5/11/2010    elevated particle number    Family history of early CAD 5/11/2010    Fe deficiency anemia 5/11/2010    H/O mammogram 7/12/2012    [facility]    HTN (hypertension) 5/11/2010    Leukopenia     Vitamin D deficiency 8/6/2010     Past Surgical History:   Procedure Laterality Date    ENDOSCOPY, COLON, DIAGNOSTIC  2/25/11    HX ACL RECONSTRUCTION Right 10/2016    HX CHOLECYSTECTOMY  11/01    laparoscopic    HX HERNIA REPAIR  9907    UMBILICAL    HX HYSTERECTOMY  8/98    ovaries spared    HX ORTHOPAEDIC Right 2003    BUNIONECTOMY    HX ORTHOPAEDIC Left 02/2016    TOTAL HIP     Allergies   Allergen Reactions    Latex Itching     ITCHING, SWELLING       Current Outpatient Prescriptions:     lisinopril-hydroCHLOROthiazide (PRINZIDE, ZESTORETIC) 20-12.5 mg per tablet, Take 1 Tab by mouth daily. , Disp: 90 Tab, Rfl: 1    atorvastatin (LIPITOR) 40 mg tablet, TAKE 1 TAB BY MOUTH DAILY. , Disp: 90 Tab, Rfl: 0    ferrous sulfate 325 mg (65 mg iron) tablet, Take 325 mg by mouth Daily (before breakfast). , Disp: , Rfl:     MULTI-VITAMIN PO, Take 1 Tab by mouth daily. ONE A DAY VITAMIN, Disp: , Rfl:   Review of Systems   Constitutional: Negative. Respiratory: Negative. Cardiovascular: Negative. Gastrointestinal: Negative. Blood pressure 138/89, pulse 68, temperature 98 °F (36.7 °C), temperature source Oral, resp. rate 18, height 5' 1\" (1.549 m), weight 229 lb 3.2 oz (104 kg), SpO2 98 %. Body mass index is 43.31 kg/(m^2). Physical Exam   Constitutional: No distress. HENT:   Mouth/Throat: Oropharynx is clear and moist.   Neck: Normal range of motion. Neck supple. Cardiovascular: Normal rate and regular rhythm. No murmur heard. Pulmonary/Chest: Effort normal and breath sounds normal.   Abdominal: Soft. Bowel sounds are normal.   Musculoskeletal:   Cyst in left 5th finger   Nursing note and vitals reviewed. ASSESSMENT and PLAN  Diagnoses and all orders for this visit:    1. Elevated cholesterol  -     LIPID PANEL    2. Encounter for immunization  -     Influenza virus vaccine (QUADRIVALENT PRES FREE SYRINGE) IM (55258)    3. Obesity, morbid ([address]          -     REFERRAL TO DIETITIAN   - Normal BMI discussed with the patient  - Advised to to try to walk and upper body exercises  - advised to eat more vegetables, cut back on carbs and fat   - portion control    4.  HTN (hypertension), benign  -     CBC W/O DIFF  -     METABOLIC PANEL, COMPREHENSIVE  - lisinopril-hydroCHLOROthiazide (PRINZIDE, ZESTORETIC) 20-12.5 mg per tablet; Take 1 Tab by mouth daily. 5. Glucose intolerance (impaired glucose tolerance)  -     HEMOGLOBIN A1C WITH EAG    6.  Cyst of joint of hand, left  -     [facility]    Follow up in six months for cpe  Pt was given an after visit summary which includes diagnosis, current medicines and vital and voiced understanding of treatment plan

## 2017-11-29 NOTE — MR AVS SNAPSHOT
Visit Information Date & Time Provider Department Dept. Phone Encounter #  
 11/29/2017  8:30 AM Otis Gill  [facility] [address] [phone] [id number] Upcoming Health Maintenance Date Due  
 PAP AKA CERVICAL CYTOLOGY 8/20/2017 BREAST CANCER SCRN MAMMOGRAM 8/25/2017 Pneumococcal 19-64 Highest Risk (2 of 3 - PCV13) 5/2/2018 COLONOSCOPY 2/25/2021 DTaP/Tdap/Td series (2 - Td) 4/17/2024 Allergies as of 11/29/2017  Review Complete On: 11/29/2017 By: Otis Gill NP Severity Noted Reaction Type Reactions Latex  04/17/2014   Systemic Itching ITCHING, SWELLING Current Immunizations  Reviewed on 10/25/2016 Name Date Influenza Vaccine (Quad) PF  Incomplete, 10/26/2016  2:07 PM  
 Influenza Vaccine Split 11/9/2010 Tdap 4/17/2014 Not reviewed this visit You Were Diagnosed With   
  
 Codes Comments Elevated cholesterol    -  Primary ICD-10-CM: E78.00 ICD-9-CM: 272.0 Encounter for immunization     ICD-10-CM: D56 ICD-9-CM: V03.89 Obesity, morbid ([facility] [address]     ICD-10-CM: E66.01 
ICD-9-CM: 278.01   
 HTN (hypertension), benign     ICD-10-CM: I10 
ICD-9-CM: 401.1 Glucose intolerance (impaired glucose tolerance)     ICD-10-CM: R73.02 
ICD-9-CM: 790.22 Cyst of joint of hand, left     ICD-10-CM: G89.816 ICD-9-CM: 719.84 Vitals BP Pulse Temp Resp Height(growth percentile) Weight(growth percentile) 138/89 (BP 1 Location: Left arm, BP Patient Position: Sitting) 68 98 °F (36.7 °C) (Oral) 18 5' 1\" (1.549 m) 229 lb 3.2 oz (104 kg) SpO2 BMI OB Status Smoking Status 98% 43.31 kg/m2 Hysterectomy Never Smoker Vitals History BMI and BSA Data Body Mass Index Body Surface Area  
 43.31 kg/m 2 2.12 m 2 Preferred Pharmacy Pharmacy Name Phone [id number] [place] [id number] [facility] [phone] Your Updated Medication List  
  
   
This list is accurate as of: 11/29/17  9:24 AM.  Always use your most recent med list.  
  
  
  
  
 atorvastatin 40 mg tablet Commonly known as:  LIPITOR  
TAKE 1 TAB BY MOUTH DAILY. ferrous sulfate 325 mg (65 mg iron) tablet Take 325 mg by mouth Daily (before breakfast). lisinopril-hydroCHLOROthiazide 20-12.5 mg per tablet Commonly known as:  PRINZIDE, ZESTORETIC  
TAKE 1 TABLET BY MOUTH EVERY DAY  
  
 MULTI-VITAMIN PO Take 1 Tab by mouth daily. ONE A DAY VITAMIN We Performed the Following CBC W/O DIFF [96678 CPT(R)] HEMOGLOBIN A1C WITH EAG [64280 CPT(R)] INFLUENZA VIRUS VAC QUAD,SPLIT,PRESV FREE SYRINGE IM [id number] CPT(R)] LIPID PANEL [05178 CPT(R)] METABOLIC PANEL, COMPREHENSIVE [80367 CPT(R)] REFERRAL TO ORTHOPEDICS [SUI174 Custom] Referral Information Referral ID Referred By Referred To  
  
 [id number] Jada Funk MD   
   [address] Phone: [phone] Fax: [phone] Visits Status Start Date End Date 1 New Request 11/29/17 11/29/18 If your referral has a status of pending review or denied, additional information will be sent to support the outcome of this decision. Patient Instructions DASH Diet: Care Instructions Your Care Instructions The DASH diet is an eating plan that can help lower your blood pressure. DASH stands for Dietary Approaches to Stop Hypertension. Hypertension is high blood pressure. The DASH diet focuses on eating foods that are high in calcium, potassium, and magnesium. These nutrients can lower blood pressure. The foods that are highest in these nutrients are fruits, vegetables, low-fat dairy products, nuts, seeds, and legumes.  But taking calcium, potassium, and magnesium supplements instead of eating foods that are high in those nutrients does not have the same effect. The DASH diet also includes whole grains, fish, and poultry. The DASH diet is one of several lifestyle changes your doctor may recommend to lower your high blood pressure. Your doctor may also want you to decrease the amount of sodium in your diet. Lowering sodium while following the DASH diet can lower blood pressure even further than just the DASH diet alone. Follow-up care is a key part of your treatment and safety. Be sure to make and go to all appointments, and call your doctor if you are having problems. It's also a good idea to know your test results and keep a list of the medicines you take. How can you care for yourself at home? Following the DASH diet · Eat 4 to 5 servings of fruit each day. A serving is 1 medium-sized piece of fruit, ½ cup chopped or canned fruit, 1/4 cup dried fruit, or 4 ounces (½ cup) of fruit juice. Choose fruit more often than fruit juice. · Eat 4 to 5 servings of vegetables each day. A serving is 1 cup of lettuce or raw leafy vegetables, ½ cup of chopped or cooked vegetables, or 4 ounces (½ cup) of vegetable juice. Choose vegetables more often than vegetable juice. · Get 2 to 3 servings of low-fat and fat-free dairy each day. A serving is 8 ounces of milk, 1 cup of yogurt, or 1 ½ ounces of cheese. · Eat 6 to 8 servings of grains each day. A serving is 1 slice of bread, 1 ounce of dry cereal, or ½ cup of cooked rice, pasta, or cooked cereal. Try to choose whole-grain products as much as possible. · Limit lean meat, poultry, and fish to 2 servings each day. A serving is 3 ounces, about the size of a deck of cards. · Eat 4 to 5 servings of nuts, seeds, and legumes (cooked dried beans, lentils, and split peas) each week. A serving is 1/3 cup of nuts, 2 tablespoons of seeds, or ½ cup of cooked beans or peas. · Limit fats and oils to 2 to 3 servings each day. A serving is 1 teaspoon of vegetable oil or 2 tablespoons of salad dressing. · Limit sweets and added sugars to 5 servings or less a week. A serving is 1 tablespoon jelly or jam, ½ cup sorbet, or 1 cup of lemonade. · Eat less than 2,300 milligrams (mg) of sodium a day. If you limit your sodium to 1,500 mg a day, you can lower your blood pressure even more. Tips for success · Start small. Do not try to make dramatic changes to your diet all at once. You might feel that you are missing out on your favorite foods and then be more likely to not follow the plan. Make small changes, and stick with them. Once those changes become habit, add a few more changes. · Try some of the following: ¨ Make it a goal to eat a fruit or vegetable at every meal and at snacks. This will make it easy to get the recommended amount of fruits and vegetables each day. ¨ Try yogurt topped with fruit and nuts for a snack or healthy dessert. ¨ Add lettuce, tomato, cucumber, and onion to sandwiches. ¨ Combine a ready-made pizza crust with low-fat mozzarella cheese and lots of vegetable toppings. Try using tomatoes, squash, spinach, broccoli, carrots, cauliflower, and onions. ¨ Have a variety of cut-up vegetables with a low-fat dip as an appetizer instead of chips and dip. ¨ Sprinkle sunflower seeds or chopped almonds over salads. Or try adding chopped walnuts or almonds to cooked vegetables. ¨ Try some vegetarian meals using beans and peas. Add garbanzo or kidney beans to salads. Make burritos and tacos with mashed amos beans or black beans. Where can you learn more? Go to http://evette-delta.info/. Enter Z179 in the search box to learn more about \"DASH Diet: Care Instructions. \" Current as of: September 21, 2016 Content Version: 11.4 © 8343-9628 [employer]. Care instructions adapted under license by [employer] (which disclaims liability or warranty for this information).  If you have questions about a medical condition or this instruction, always ask your healthcare professional. [place] [address] any warranty or liability for your use of this information. Introducing [facility] & [facility] SERVICES! Danyell Henao introduces [employer] patient portal. Now you can access parts of your medical record, email your doctor's office, and request medication refills online. 1. In your internet browser, go to [email or web address] 2. Click on the First Time User? Click Here link in the Sign In box. You will see the New Member Sign Up page. 3. Enter your [employer] Access Code exactly as it appears below. You will not need to use this code after youve completed the sign-up process. If you do not sign up before the expiration date, you must request a new code. · [employer] Access Code: [email or web address] Expires: 2/27/2018  9:24 AM 
 
4. Enter the last four digits of your Social Security Number (xxxx) and Date of Birth (mm/dd/yyyy) as indicated and click Submit. You will be taken to the next sign-up page. 5. Create a [employer] ID. This will be your [employer] login ID and cannot be changed, so think of one that is secure and easy to remember. 6. Create a [employer] password. You can change your password at any time. 7. Enter your Password Reset Question and Answer. This can be used at a later time if you forget your password. 8. Enter your e-mail address. You will receive e-mail notification when new information is available in [address] 9. Click Sign Up. You can now view and download portions of your medical record. 10. Click the Download Summary menu link to download a portable copy of your medical information. If you have questions, please visit the Frequently Asked Questions section of the [employer] website. Remember, [employer] is NOT to be used for urgent needs. For medical emergencies, dial 911. Now available from your iPhone and Android! Please provide this summary of care documentation to your next provider. Your primary care clinician is listed as Jesus BASHIR. If you have any questions after today's visit, please call [phone].

## 2017-11-30 LAB
ALBUMIN SERPL-MCNC: 4.2 G/DL (ref 3.5–5.5)
ALBUMIN/GLOB SERPL: 1.4 {RATIO} (ref 1.2–2.2)
ALP SERPL-CCNC: 89 IU/L (ref 39–117)
ALT SERPL-CCNC: 26 IU/L (ref 0–32)
AST SERPL-CCNC: 23 IU/L (ref 0–40)
BILIRUB SERPL-MCNC: 0.4 MG/DL (ref 0–1.2)
BUN SERPL-MCNC: 14 MG/DL (ref 6–24)
BUN/CREAT SERPL: 21 (ref 9–23)
CALCIUM SERPL-MCNC: 9 MG/DL (ref 8.7–10.2)
CHLORIDE SERPL-SCNC: 103 MMOL/L (ref 96–106)
CHOLEST SERPL-MCNC: 158 MG/DL (ref 100–199)
CO2 SERPL-SCNC: 22 MMOL/L (ref 18–29)
CREAT SERPL-MCNC: 0.66 MG/DL (ref 0.57–1)
ERYTHROCYTE [DISTWIDTH] IN BLOOD BY AUTOMATED COUNT: 14.3 % (ref 12.3–15.4)
EST. AVERAGE GLUCOSE BLD GHB EST-MCNC: 134 MG/DL
GFR SERPLBLD CREATININE-BSD FMLA CKD-EPI: 113 ML/MIN/1.73
GFR SERPLBLD CREATININE-BSD FMLA CKD-EPI: 98 ML/MIN/1.73
GLOBULIN SER CALC-MCNC: 3.1 G/DL (ref 1.5–4.5)
GLUCOSE SERPL-MCNC: 107 MG/DL (ref 65–99)
HBA1C MFR BLD: 6.3 % (ref 4.8–5.6)
HCT VFR BLD AUTO: 40 % (ref 34–46.6)
HDLC SERPL-MCNC: 55 MG/DL
HGB BLD-MCNC: 13.2 G/DL (ref 11.1–15.9)
INTERPRETATION, 910389: NORMAL
LDLC SERPL CALC-MCNC: 85 MG/DL (ref 0–99)
MCH RBC QN AUTO: 29.1 PG (ref 26.6–33)
MCHC RBC AUTO-ENTMCNC: 33 G/DL (ref 31.5–35.7)
MCV RBC AUTO: 88 FL (ref 79–97)
PLATELET # BLD AUTO: 230 X10E3/UL (ref 150–379)
POTASSIUM SERPL-SCNC: 4 MMOL/L (ref 3.5–5.2)
PROT SERPL-MCNC: 7.3 G/DL (ref 6–8.5)
RBC # BLD AUTO: 4.54 X10E6/UL (ref 3.77–5.28)
SODIUM SERPL-SCNC: 141 MMOL/L (ref 134–144)
TRIGL SERPL-MCNC: 88 MG/DL (ref 0–149)
VLDLC SERPL CALC-MCNC: 18 MG/DL (ref 5–40)
WBC # BLD AUTO: 3.8 X10E3/UL (ref 3.4–10.8)

## 2018-01-14 RX ORDER — ATORVASTATIN CALCIUM 40 MG/1
TABLET, FILM COATED ORAL
Qty: 90 TAB | Refills: 0 | Status: SHIPPED | OUTPATIENT
Start: 2018-01-14 | End: 2018-04-19 | Stop reason: SDUPTHER

## 2018-04-20 RX ORDER — ATORVASTATIN CALCIUM 40 MG/1
TABLET, FILM COATED ORAL
Qty: 90 TAB | Refills: 0 | Status: SHIPPED | OUTPATIENT
Start: 2018-04-20 | End: 2018-08-01 | Stop reason: SDUPTHER

## 2018-05-04 ENCOUNTER — OFFICE VISIT (OUTPATIENT)
Dept: FAMILY MEDICINE CLINIC | Age: 58
End: 2018-05-04

## 2018-05-04 VITALS
WEIGHT: 222 LBS | BODY MASS INDEX: 41.91 KG/M2 | HEART RATE: 69 BPM | OXYGEN SATURATION: 98 % | SYSTOLIC BLOOD PRESSURE: 123 MMHG | RESPIRATION RATE: 18 BRPM | DIASTOLIC BLOOD PRESSURE: 74 MMHG | TEMPERATURE: 98.2 F | HEIGHT: 61 IN

## 2018-05-04 DIAGNOSIS — E78.00 ELEVATED CHOLESTEROL: ICD-10-CM

## 2018-05-04 DIAGNOSIS — I10 HTN (HYPERTENSION), BENIGN: ICD-10-CM

## 2018-05-04 DIAGNOSIS — E66.01 OBESITY, MORBID (HCC): ICD-10-CM

## 2018-05-04 DIAGNOSIS — Z00.00 ROUTINE GENERAL MEDICAL EXAMINATION AT A HEALTH CARE FACILITY: Primary | ICD-10-CM

## 2018-05-04 DIAGNOSIS — R73.02 GLUCOSE INTOLERANCE (IMPAIRED GLUCOSE TOLERANCE): ICD-10-CM

## 2018-05-04 DIAGNOSIS — Z23 ENCOUNTER FOR IMMUNIZATION: ICD-10-CM

## 2018-05-04 NOTE — MR AVS SNAPSHOT
[address] 
[phone] Patient: Uady Jj MRN: [id number] :1960 Visit Information Date & Time Provider Department Dept. Phone Encounter #  
 2018  9:00 AM Izabella Brown, [address] [phone] [id number] Upcoming Health Maintenance Date Due Pneumococcal 19-64 Highest Risk (2 of 3 - PCV13) 2018 Influenza Age 5 to Adult 2018 BREAST CANCER SCRN MAMMOGRAM 10/18/2019 PAP AKA CERVICAL CYTOLOGY 2020 COLONOSCOPY 2021 DTaP/Tdap/Td series (2 - Td) 2024 Allergies as of 2018  Review Complete On: 2018 By: Kevin Mon LPN Severity Noted Reaction Type Reactions Latex  2014   Systemic Itching ITCHING, SWELLING Current Immunizations  Reviewed on 2017 Name Date Influenza Vaccine (Quad) PF 2017, 10/26/2016  2:07 PM  
 Influenza Vaccine Split 2010 Tdap 2014 Not reviewed this visit You Were Diagnosed With   
  
 Codes Comments Routine general medical examination at a health care facility    -  Primary ICD-10-CM: Z00.00 ICD-9-CM: V70.0 Obesity, morbid ([address]     ICD-10-CM: E66.01 
ICD-9-CM: 278.01   
 HTN (hypertension), benign     ICD-10-CM: I10 
ICD-9-CM: 401.1 Glucose intolerance (impaired glucose tolerance)     ICD-10-CM: R73.02 
ICD-9-CM: 790.22 Elevated cholesterol     ICD-10-CM: E78.00 ICD-9-CM: 272.0 Vitals BP Pulse Temp Resp Height(growth percentile) Weight(growth percentile) 123/74 (BP 1 Location: Left arm, BP Patient Position: Sitting) 69 98.2 °F (36.8 °C) (Oral) 18 5' 1\" (1.549 m) 222 lb (100.7 kg) SpO2 BMI OB Status Smoking Status 98% 41.95 kg/m2 Hysterectomy Never Smoker Vitals History BMI and BSA Data Body Mass Index Body Surface Area 41.95 kg/m 2 2.08 m 2 Preferred Pharmacy Pharmacy Name Phone [id number] [place] [phone] Your Updated Medication List  
  
   
This list is accurate as of 5/4/18  9:25 AM.  Always use your most recent med list.  
  
  
  
  
 atorvastatin 40 mg tablet Commonly known as:  LIPITOR  
TAKE 1 TAB BY MOUTH DAILY. ferrous sulfate 325 mg (65 mg iron) tablet Take 325 mg by mouth Daily (before breakfast). lisinopril-hydroCHLOROthiazide 20-12.5 mg per tablet Commonly known as:  Jayda Bailey Take 1 Tab by mouth daily. MULTI-VITAMIN PO Take 1 Tab by mouth daily. ONE A DAY VITAMIN We Performed the Following CBC W/O DIFF [34734 CPT(R)] HEMOGLOBIN A1C WITH EAG [13317 CPT(R)] LIPID PANEL [93691 CPT(R)] METABOLIC PANEL, COMPREHENSIVE [03202 CPT(R)] TSH 3RD GENERATION [14129 CPT(R)] VITAMIN D, 25 HYDROXY S483608 CPT(R)] Introducing [facility] & HEALTH SERVICES! Destini Evans introduces [employer] patient portal. Now you can access parts of your medical record, email your doctor's office, and request medication refills online. 1. In your internet browser, go to [email or web address] 2. Click on the First Time User? Click Here link in the Sign In box. You will see the New Member Sign Up page. 3. Enter your [employer] Access Code exactly as it appears below. You will not need to use this code after youve completed the sign-up process. If you do not sign up before the expiration date, you must request a new code. · [employer] Access Code: [email or web address] Expires: 8/2/2018  9:25 AM 
 
4. Enter the last four digits of your Social Security Number (xxxx) and Date of Birth (mm/dd/yyyy) as indicated and click Submit. You will be taken to the next sign-up page. 5. Create a [employer] ID. This will be your [employer] login ID and cannot be changed, so think of one that is secure and easy to remember. 6. Create a [employer] password. You can change your password at any time. 7. Enter your Password Reset Question and Answer. This can be used at a later time if you forget your password. 8. Enter your e-mail address. You will receive e-mail notification when new information is available in [address] 9. Click Sign Up. You can now view and download portions of your medical record. 10. Click the Download Summary menu link to download a portable copy of your medical information. If you have questions, please visit the Frequently Asked Questions section of the [employer] website. Remember, [employer] is NOT to be used for urgent needs. For medical emergencies, dial 911. Now available from your iPhone and Android! Please provide this summary of care documentation to your next provider. Your primary care clinician is listed as Lebron BASHIR. If you have any questions after today's visit, please call [phone].

## 2018-05-04 NOTE — PROGRESS NOTES
Chief Complaint   Patient presents with    Complete Physical    Labs     patient fasting     1. Have you been to the ER, urgent care clinic since your last visit? Hospitalized since your last visit? No    2. Have you seen or consulted any other health care providers outside of the [address] [place] [address] since your last visit? Include any pap smears or colon screening. No    Received verbal order per NP, Aparna Keller to administer Pneumonia 23 vaccine. Injection was given in the left deltoid, IM. Patient tolerated injection well with no adverse reactions while here in the office.

## 2018-05-04 NOTE — PROGRESS NOTES
Subjective:   62 y.o. female for Well Woman Check. Her gyne and breast care is done elsewhere by her Ob-Gyne physician. Patient Active Problem List    Diagnosis Date Noted    Obesity, morbid ([facility] [address] 11/29/2017    HTN (hypertension), benign 11/29/2017    Glucose intolerance (impaired glucose tolerance) 11/29/2017    Status post total right knee replacement 12/14/2016    Primary osteoarthritis of right knee 10/24/2016    History of total left hip replacement 07/21/2016    Avascular necrosis of bone of left hip ([facility] [address] 06/24/2016    Primary osteoarthritis of left hip 02/29/2016    Leukopenia     Vitamin D deficiency     Diverticulosis of sigmoid     Eczema 05/11/2010    Family history of early CAD 05/11/2010    Elevated cholesterol 05/11/2010    CRP elevated 05/11/2010    Common migraine 05/11/2010     Current Outpatient Prescriptions   Medication Sig Dispense Refill    atorvastatin (LIPITOR) 40 mg tablet TAKE 1 TAB BY MOUTH DAILY. 90 Tab 0    lisinopril-hydroCHLOROthiazide (PRINZIDE, ZESTORETIC) 20-12.5 mg per tablet Take 1 Tab by mouth daily. 90 Tab 1    ferrous sulfate 325 mg (65 mg iron) tablet Take 325 mg by mouth Daily (before breakfast).  MULTI-VITAMIN PO Take 1 Tab by mouth daily.  ONE A DAY VITAMIN       Allergies   Allergen Reactions    Latex Itching     ITCHING, SWELLING     Past Medical History:   Diagnosis Date    [facility] 12/07[facility]2014    NO TREATMENT NEEDED    Adverse effect of anesthesia 10/03/2016    \"TEND TO TAKE A LITTLE LONGER FOR EFFECT TO WEAR OFF\"    Adverse effect of anesthesia     NO KNOWN HISTORY OF ANESTHESIA PROBLEMS IN FAMILY    Arthritis     Bursitis of left hip     Chronic pain     HIP, KNEES    Common migraine 05/11/2010    PT DENIES THIS DIAGNOSIS ON 10/3/16    CRP elevated 5/11/2010    Diverticulosis of sigmoid 05/2010    HAS HAD 2-3 EPISODES OF DIVERTICULITIS, PT STATES ON 10/3/16    Eczema 5/11/2010    Elevated cholesterol 5/11/2010 elevated particle number    Family history of early CAD 5/11/2010    Fe deficiency anemia 5/11/2010    H/O mammogram 7/12/2012    [address]    HTN (hypertension) 5/11/2010    Leukopenia     Vitamin D deficiency 8/6/2010     Past Surgical History:   Procedure Laterality Date    ENDOSCOPY, COLON, DIAGNOSTIC  2/25/11    HX ACL RECONSTRUCTION Right 10/2016    HX CHOLECYSTECTOMY  11/01    laparoscopic    HX HERNIA REPAIR  5335    UMBILICAL    HX HYSTERECTOMY  8/98    ovaries spared    HX ORTHOPAEDIC Right 2003    BUNIONECTOMY    HX ORTHOPAEDIC Left 02/2016    TOTAL HIP     Family History   Problem Relation Age of Onset    Colon Cancer Paternal Grandmother     Cancer Paternal Grandmother     Colon Cancer Paternal Grandfather     Arthritis-osteo Mother     Diabetes Mother     Elevated Lipids Mother     Hypertension Mother     Diabetes Father     Arthritis-osteo Father     Anesth Problems Neg Hx      Social History   Substance Use Topics    Smoking status: Never Smoker    Smokeless tobacco: Never Used    Alcohol use Yes      Comment: rare        Specific concerns today: Health maintenance: Morbid obesity: patient doesn't exercise, but she is active during the day. doesn't watch her diet  She is due for pneumonia vaccine  Her pap and mmo gram is done by her GYN    Review of Systems  A comprehensive review of systems was negative except for that written in the HPI. Objective:   Blood pressure 123/74, pulse 69, temperature 98.2 °F (36.8 °C), temperature source Oral, resp. rate 18, height 5' 1\" (1.549 m), weight 222 lb (100.7 kg), SpO2 98 %.    Physical Examination:   General appearance - alert, well appearing, and in no distress  Mental status - alert, oriented to person, place, and time  Eyes - pupils equal and reactive, extraocular eye movements intact  Ears - bilateral TM's and external ear canals normal  Nose - normal and patent, no erythema, discharge or polyps  Mouth - mucous membranes moist, pharynx normal without lesions  Neck - supple, no significant adenopathy  Lymphatics - no palpable lymphadenopathy, no hepatosplenomegaly  Chest - clear to auscultation, no wheezes, rales or rhonchi, symmetric air entry  Heart - normal rate, regular rhythm, normal S1, S2, no murmurs, rubs, clicks or gallops  Abdomen - soft, nontender, nondistended, no masses or organomegaly  Back exam - full range of motion, no tenderness, palpable spasm or pain on motion  Neurological - alert, oriented, normal speech, no focal findings or movement disorder noted  Musculoskeletal - no joint tenderness, deformity or swelling  Extremities - peripheral pulses normal, no pedal edema, no clubbing or cyanosis  Skin - normal coloration and turgor, no rashes, no suspicious skin lesions noted     Assessment/Plan:     lose weight, increase physical activity, follow low fat diet, routine labs ordered    ICD-10-CM ICD-9-CM    1. Routine general medical examination at a health care facility U82.13 S51.1 METABOLIC PANEL, COMPREHENSIVE      VITAMIN D, 25 HYDROXY      TSH 3RD GENERATION   2. Obesity, morbid ([address] E66.01 278.01 Normal BMI discussed, advised diet and exercise   3. HTN (hypertension), benign I10 401.1 CBC W/O DIFF   4. Glucose intolerance (impaired glucose tolerance) R73.02 790.22 HEMOGLOBIN A1C WITH EAG   5. Elevated cholesterol E78.00 272.0 LIPID PANEL   6.  Encounter for immunization Z23 V03.89 PNEUMOCOCCAL POLYSACCHARIDE VACCINE, 23-VALENT, ADULT OR IMMUNOSUPPRESSED PT DOSE,      NM IMMUNIZ ADMIN,1 SINGLE/COMB VAC/TOXOID   Pt was given an after visit summary which includes diagnosis, current medicines and vital and voiced understanding of treatment plan

## 2018-05-05 LAB
25(OH)D3+25(OH)D2 SERPL-MCNC: 31.2 NG/ML (ref 30–100)
ALBUMIN SERPL-MCNC: 4.2 G/DL (ref 3.5–5.5)
ALBUMIN/GLOB SERPL: 1.4 {RATIO} (ref 1.2–2.2)
ALP SERPL-CCNC: 86 IU/L (ref 39–117)
ALT SERPL-CCNC: 26 IU/L (ref 0–32)
AST SERPL-CCNC: 23 IU/L (ref 0–40)
BILIRUB SERPL-MCNC: 0.4 MG/DL (ref 0–1.2)
BUN SERPL-MCNC: 13 MG/DL (ref 6–24)
BUN/CREAT SERPL: 22 (ref 9–23)
CALCIUM SERPL-MCNC: 9.3 MG/DL (ref 8.7–10.2)
CHLORIDE SERPL-SCNC: 100 MMOL/L (ref 96–106)
CHOLEST SERPL-MCNC: 147 MG/DL (ref 100–199)
CO2 SERPL-SCNC: 24 MMOL/L (ref 18–29)
CREAT SERPL-MCNC: 0.59 MG/DL (ref 0.57–1)
ERYTHROCYTE [DISTWIDTH] IN BLOOD BY AUTOMATED COUNT: 14.5 % (ref 12.3–15.4)
EST. AVERAGE GLUCOSE BLD GHB EST-MCNC: 134 MG/DL
GFR SERPLBLD CREATININE-BSD FMLA CKD-EPI: 101 ML/MIN/1.73
GFR SERPLBLD CREATININE-BSD FMLA CKD-EPI: 117 ML/MIN/1.73
GLOBULIN SER CALC-MCNC: 3 G/DL (ref 1.5–4.5)
GLUCOSE SERPL-MCNC: 105 MG/DL (ref 65–99)
HBA1C MFR BLD: 6.3 % (ref 4.8–5.6)
HCT VFR BLD AUTO: 37.6 % (ref 34–46.6)
HDLC SERPL-MCNC: 54 MG/DL
HGB BLD-MCNC: 12.6 G/DL (ref 11.1–15.9)
INTERPRETATION, 910389: NORMAL
LDLC SERPL CALC-MCNC: 71 MG/DL (ref 0–99)
MCH RBC QN AUTO: 29.2 PG (ref 26.6–33)
MCHC RBC AUTO-ENTMCNC: 33.5 G/DL (ref 31.5–35.7)
MCV RBC AUTO: 87 FL (ref 79–97)
PLATELET # BLD AUTO: 231 X10E3/UL (ref 150–379)
POTASSIUM SERPL-SCNC: 4 MMOL/L (ref 3.5–5.2)
PROT SERPL-MCNC: 7.2 G/DL (ref 6–8.5)
RBC # BLD AUTO: 4.32 X10E6/UL (ref 3.77–5.28)
SODIUM SERPL-SCNC: 139 MMOL/L (ref 134–144)
TRIGL SERPL-MCNC: 108 MG/DL (ref 0–149)
TSH SERPL DL<=0.005 MIU/L-ACNC: 1.76 UIU/ML (ref 0.45–4.5)
VLDLC SERPL CALC-MCNC: 22 MG/DL (ref 5–40)
WBC # BLD AUTO: 3.4 X10E3/UL (ref 3.4–10.8)

## 2018-08-01 RX ORDER — ATORVASTATIN CALCIUM 40 MG/1
TABLET, FILM COATED ORAL
Qty: 90 TAB | Refills: 0 | Status: SHIPPED | OUTPATIENT
Start: 2018-08-01 | End: 2019-02-14 | Stop reason: SDUPTHER

## 2018-08-01 NOTE — TELEPHONE ENCOUNTER
Patient is calling requesting for medication refill to be called into pharmacy     Requested Prescriptions     Pending Prescriptions Disp Refills    atorvastatin (LIPITOR) 40 mg tablet 90 Tab 0     Pharmacy on file verified     Best call back [phone]

## 2018-08-26 ENCOUNTER — APPOINTMENT (OUTPATIENT)
Dept: CT IMAGING | Age: 58
End: 2018-08-26
Attending: EMERGENCY MEDICINE
Payer: COMMERCIAL

## 2018-08-26 ENCOUNTER — HOSPITAL ENCOUNTER (EMERGENCY)
Age: 58
Discharge: HOME | End: 2018-08-26
Attending: EMERGENCY MEDICINE
Payer: COMMERCIAL

## 2018-08-26 ENCOUNTER — OFFICE VISIT (OUTPATIENT)
Dept: URGENT CARE | Age: 58
End: 2018-08-26

## 2018-08-26 VITALS
OXYGEN SATURATION: 98 % | BODY MASS INDEX: 43.39 KG/M2 | HEIGHT: 61 IN | HEART RATE: 92 BPM | TEMPERATURE: 98.7 F | DIASTOLIC BLOOD PRESSURE: 82 MMHG | WEIGHT: 229.8 LBS | SYSTOLIC BLOOD PRESSURE: 137 MMHG | RESPIRATION RATE: 20 BRPM

## 2018-08-26 VITALS
HEART RATE: 94 BPM | SYSTOLIC BLOOD PRESSURE: 139 MMHG | DIASTOLIC BLOOD PRESSURE: 73 MMHG | OXYGEN SATURATION: 98 % | TEMPERATURE: 99 F | RESPIRATION RATE: 16 BRPM | BODY MASS INDEX: 42.29 KG/M2 | WEIGHT: 224 LBS | HEIGHT: 61 IN

## 2018-08-26 DIAGNOSIS — R10.84 GENERALIZED ABDOMINAL PAIN: Primary | ICD-10-CM

## 2018-08-26 DIAGNOSIS — K57.92 DIVERTICULITIS: Primary | ICD-10-CM

## 2018-08-26 DIAGNOSIS — Z87.19 HX OF DIVERTICULITIS OF COLON: ICD-10-CM

## 2018-08-26 LAB
ALBUMIN SERPL-MCNC: 3.6 G/DL (ref 3.5–5)
ALBUMIN/GLOB SERPL: 0.8 {RATIO} (ref 1.1–2.2)
ALP SERPL-CCNC: 92 U/L (ref 45–117)
ALT SERPL-CCNC: 41 U/L (ref 12–78)
ANION GAP SERPL CALC-SCNC: 7 MMOL/L (ref 5–15)
APPEARANCE UR: CLEAR
AST SERPL-CCNC: 42 U/L (ref 15–37)
BACTERIA URNS QL MICRO: NEGATIVE /HPF
BASOPHILS # BLD: 0 K/UL (ref 0–0.1)
BASOPHILS NFR BLD: 0 % (ref 0–1)
BILIRUB SERPL-MCNC: 0.5 MG/DL (ref 0.2–1)
BILIRUB UR QL: NEGATIVE
BUN SERPL-MCNC: 11 MG/DL (ref 6–20)
BUN/CREAT SERPL: 15 (ref 12–20)
CALCIUM SERPL-MCNC: 8.3 MG/DL (ref 8.5–10.1)
CHLORIDE SERPL-SCNC: 106 MMOL/L (ref 97–108)
CO2 SERPL-SCNC: 28 MMOL/L (ref 21–32)
COLOR UR: NORMAL
COMMENT, HOLDF: NORMAL
CREAT SERPL-MCNC: 0.71 MG/DL (ref 0.55–1.02)
DIFFERENTIAL METHOD BLD: ABNORMAL
EOSINOPHIL # BLD: 0.1 K/UL (ref 0–0.4)
EOSINOPHIL NFR BLD: 1 % (ref 0–7)
EPITH CASTS URNS QL MICRO: NORMAL /LPF
ERYTHROCYTE [DISTWIDTH] IN BLOOD BY AUTOMATED COUNT: 13.2 % (ref 11.5–14.5)
GLOBULIN SER CALC-MCNC: 4.6 G/DL (ref 2–4)
GLUCOSE SERPL-MCNC: 112 MG/DL (ref 65–100)
GLUCOSE UR STRIP.AUTO-MCNC: NEGATIVE MG/DL
HCT VFR BLD AUTO: 40.3 % (ref 35–47)
HGB BLD-MCNC: 13.3 G/DL (ref 11.5–16)
HGB UR QL STRIP: NEGATIVE
HYALINE CASTS URNS QL MICRO: NORMAL /LPF (ref 0–5)
IMM GRANULOCYTES # BLD: 0 K/UL (ref 0–0.04)
IMM GRANULOCYTES NFR BLD AUTO: 0 % (ref 0–0.5)
KETONES UR QL STRIP.AUTO: NEGATIVE MG/DL
LEUKOCYTE ESTERASE UR QL STRIP.AUTO: NEGATIVE
LIPASE SERPL-CCNC: 107 U/L (ref 73–393)
LYMPHOCYTES # BLD: 1 K/UL (ref 0.8–3.5)
LYMPHOCYTES NFR BLD: 15 % (ref 12–49)
MCH RBC QN AUTO: 29.8 PG (ref 26–34)
MCHC RBC AUTO-ENTMCNC: 33 G/DL (ref 30–36.5)
MCV RBC AUTO: 90.4 FL (ref 80–99)
MONOCYTES # BLD: 0.5 K/UL (ref 0–1)
MONOCYTES NFR BLD: 7 % (ref 5–13)
NEUTS SEG # BLD: 5.4 K/UL (ref 1.8–8)
NEUTS SEG NFR BLD: 77 % (ref 32–75)
NITRITE UR QL STRIP.AUTO: NEGATIVE
NRBC # BLD: 0 K/UL (ref 0–0.01)
NRBC BLD-RTO: 0 PER 100 WBC
PH UR STRIP: 7 [PH] (ref 5–8)
PLATELET # BLD AUTO: 199 K/UL (ref 150–400)
PMV BLD AUTO: 10.3 FL (ref 8.9–12.9)
POTASSIUM SERPL-SCNC: 4.4 MMOL/L (ref 3.5–5.1)
PROT SERPL-MCNC: 8.2 G/DL (ref 6.4–8.2)
PROT UR STRIP-MCNC: NEGATIVE MG/DL
RBC # BLD AUTO: 4.46 M/UL (ref 3.8–5.2)
RBC #/AREA URNS HPF: NORMAL /HPF (ref 0–5)
SAMPLES BEING HELD,HOLD: NORMAL
SODIUM SERPL-SCNC: 141 MMOL/L (ref 136–145)
SP GR UR REFRACTOMETRY: 1.01 (ref 1–1.03)
UR CULT HOLD, URHOLD: NORMAL
UROBILINOGEN UR QL STRIP.AUTO: 1 EU/DL (ref 0.2–1)
WBC # BLD AUTO: 7 K/UL (ref 3.6–11)
WBC URNS QL MICRO: NORMAL /HPF (ref 0–4)

## 2018-08-26 PROCEDURE — 83690 ASSAY OF LIPASE: CPT | Performed by: EMERGENCY MEDICINE

## 2018-08-26 PROCEDURE — 80053 COMPREHEN METABOLIC PANEL: CPT | Performed by: EMERGENCY MEDICINE

## 2018-08-26 PROCEDURE — 99283 EMERGENCY DEPT VISIT LOW MDM: CPT

## 2018-08-26 PROCEDURE — 74177 CT ABD & PELVIS W/CONTRAST: CPT

## 2018-08-26 PROCEDURE — 85025 COMPLETE CBC W/AUTO DIFF WBC: CPT | Performed by: EMERGENCY MEDICINE

## 2018-08-26 PROCEDURE — 36415 COLL VENOUS BLD VENIPUNCTURE: CPT | Performed by: EMERGENCY MEDICINE

## 2018-08-26 PROCEDURE — 74011636320 HC RX REV CODE- 636/320: Performed by: EMERGENCY MEDICINE

## 2018-08-26 PROCEDURE — 74011250637 HC RX REV CODE- 250/637: Performed by: EMERGENCY MEDICINE

## 2018-08-26 PROCEDURE — 74011000258 HC RX REV CODE- 258: Performed by: EMERGENCY MEDICINE

## 2018-08-26 PROCEDURE — 74011000250 HC RX REV CODE- 250: Performed by: EMERGENCY MEDICINE

## 2018-08-26 PROCEDURE — 81001 URINALYSIS AUTO W/SCOPE: CPT | Performed by: EMERGENCY MEDICINE

## 2018-08-26 RX ORDER — SODIUM CHLORIDE 0.9 % (FLUSH) 0.9 %
10 SYRINGE (ML) INJECTION
Status: COMPLETED | OUTPATIENT
Start: 2018-08-26 | End: 2018-08-26

## 2018-08-26 RX ORDER — CIPROFLOXACIN 500 MG/1
500 TABLET ORAL 2 TIMES DAILY
Qty: 20 TAB | Refills: 0 | Status: SHIPPED | OUTPATIENT
Start: 2018-08-26 | End: 2018-09-05

## 2018-08-26 RX ORDER — GLUCOSAMINE SULFATE 1500 MG
POWDER IN PACKET (EA) ORAL DAILY
COMMUNITY
End: 2021-10-13 | Stop reason: SDUPTHER

## 2018-08-26 RX ORDER — METRONIDAZOLE 500 MG/1
500 TABLET ORAL 3 TIMES DAILY
Qty: 30 TAB | Refills: 0 | Status: SHIPPED | OUTPATIENT
Start: 2018-08-26 | End: 2018-09-05

## 2018-08-26 RX ADMIN — IOPAMIDOL 100 ML: 755 INJECTION, SOLUTION INTRAVENOUS at 13:58

## 2018-08-26 RX ADMIN — Medication 10 ML: at 13:58

## 2018-08-26 RX ADMIN — SODIUM CHLORIDE 100 ML: 900 INJECTION, SOLUTION INTRAVENOUS at 13:58

## 2018-08-26 RX ADMIN — LIDOCAINE HYDROCHLORIDE 40 ML: 20 SOLUTION ORAL; TOPICAL at 12:01

## 2018-08-26 NOTE — PROGRESS NOTES
HPI Comments:   Here for generalized abdominal pain with focal intensity of periumbilical to left lower quadrant. Pain onset yesterday and rapidly has gotten worse. Reporting 10/10 sharp stabbing pain today. She has felt nauseated without vomiting. Has had 2-3 bouts of diarrhea. 1 last night and x 2 today without blood mucus or pus present. She has chills but has not checked for fever. Hx of diverticulitis, this seems similar. No known sick contacts. Hx of hysterectomy 20 yrs ago. Hx significant for: HLD, HTN, migraine headaches, eczema, diverticulosis, Osteoarthritis    Patient is a 62 y.o. female presenting with abdominal pain. Abdominal Pain    Associated symptoms include nausea. Pertinent negatives include no chest pain.         Past Medical History:   Diagnosis Date    Adrenal nodule ([address] 12/07/2014    NO TREATMENT NEEDED    Adverse effect of anesthesia 10/03/2016    \"TEND TO TAKE A LITTLE LONGER FOR EFFECT TO WEAR OFF\"    Adverse effect of anesthesia     NO KNOWN HISTORY OF ANESTHESIA PROBLEMS IN FAMILY    Arthritis     Bursitis of left hip     Chronic pain     HIP, KNEES    Common migraine 05/11/2010    PT DENIES THIS DIAGNOSIS ON 10/3/16    CRP elevated 5/11/2010    Diverticulosis of sigmoid 05/2010    HAS HAD 2-3 EPISODES OF DIVERTICULITIS, PT STATES ON 10/3/16    Eczema 5/11/2010    Elevated cholesterol 5/11/2010    elevated particle number    Family history of early CAD 5/11/2010    Fe deficiency anemia 5/11/2010    H/O mammogram 7/12/[address]    [address]    HTN (hypertension) 5/11/2010    Leukopenia     Vitamin D deficiency 8/6/2010        Past Surgical History:   Procedure Laterality Date    ENDOSCOPY, COLON, DIAGNOSTIC  2/25/11    HX ACL RECONSTRUCTION Right 10/2016    HX CHOLECYSTECTOMY  11/01    laparoscopic    HX HERNIA REPAIR  3826    UMBILICAL    HX HYSTERECTOMY  8/98    ovaries spared    HX ORTHOPAEDIC Right 2003    BUNIONECTOMY    HX ORTHOPAEDIC Left 02/2016    TOTAL HIP         Family History   Problem Relation Age of Onset    Colon Cancer Paternal Grandmother     Cancer Paternal Grandmother     Colon Cancer Paternal Grandfather     Arthritis-osteo Mother     Diabetes Mother     Elevated Lipids Mother     Hypertension Mother     Diabetes Father     Arthritis-osteo Father     Anesth Problems Neg Hx         Social History     Social History    Marital status:      Spouse name: N/A    Number of children: 2    Years of education: N/A     Occupational History    Clerical      Social History Main Topics    Smoking status: Never Smoker    Smokeless tobacco: Never Used    Alcohol use Yes      Comment: rare    Drug use: No    Sexual activity: Yes     Partners: Male     Birth control/ protection: None     Other Topics Concern    Not on file     Social History Narrative                ALLERGIES: Latex    Review of Systems   Constitutional: Positive for chills. Respiratory: Negative for chest tightness and shortness of breath. Cardiovascular: Negative for chest pain. Gastrointestinal: Positive for abdominal pain and nausea. Neurological: Negative for dizziness, weakness and light-headedness. All other systems reviewed and are negative. Vitals:    08/26/18 1024   BP: 139/73   Pulse: 94   Resp: 16   Temp: 99 °F (37.2 °C)   SpO2: 98%   Weight: 224 lb (101.6 kg)   Height: 5' 1\" (1.549 m)       Physical Exam   Constitutional: She is oriented to person, place, and time. No distress. Appears to not feel well   HENT:   Mouth/Throat: Oropharynx is clear and moist.   Eyes: EOM are normal.   Neck: Neck supple. Cardiovascular: Normal heart sounds. Exam reveals no gallop and no friction rub. No murmur heard. Apical pulse 96bpm   Pulmonary/Chest: Effort normal and breath sounds normal. No respiratory distress. She has no wheezes. She has no rales. Abdominal: Soft. Bowel sounds are normal. She exhibits no distension.  There is no hepatosplenomegaly. There is negative De La Rosa's sign. No abdominal bruits. Lymphadenopathy:     She has no cervical adenopathy. Neurological: She is alert and oriented to person, place, and time. Skin: She is not diaphoretic. Psychiatric: She has a normal mood and affect. Her behavior is normal. Thought content normal.       MDM     Differential Diagnosis; Clinical Impression; Plan:       CLINICAL IMPRESSION:  (R10.84) Generalized abdominal pain  (primary encounter diagnosis)  (Z87.19) Hx of diverticulitis of colon      Plan:  1. Reporting 10/10 abdominal pain with guarding on exam. VS stable, afebrile. Hx of diverticulitis. I have concern with initiating outpatient treatment without imaging given pain severity. May warrant CT abdomen; patient advised ED eval immediately after leaving urgent care. Will be riding by car with .               Procedures

## 2018-08-26 NOTE — PATIENT INSTRUCTIONS
Go to one of the following below immediately without delay:    [address]   ([address])  Wilner Hines   [phone]   Open 24 hours    [facility]   ([address])  [address] [place] [address]   [phone] 24 hours    [facility]  ([address])  [address] [place] [address]   [phone]

## 2018-08-26 NOTE — LETTER
[address] 
[address] [facility] [id number] 
[phone] Work/School Note Date: 8/26/2018 To Whom It May concern: 
 
Koffi Troy was seen and treated today in the emergency room by the following provider(s): 
Attending Provider: Sheila Cancino MD. Koffi Troy may return to work on Wednesday August 29th, 2018 . Sincerely, Sarika Holstein

## 2018-08-26 NOTE — ED TRIAGE NOTES
Triage note: Pt states she is having lower abd pain that she states is similar to her diverticulitis. Pt states the pain began yesterday. +frequent BM.   Denies N/V.

## 2018-08-26 NOTE — ED NOTES
11:23 AM  I have evaluated the patient as the Provider in Triage. I have reviewed Her vital signs and the triage nurse assessment. I have talked with the patient and any available family and advised that I am the provider in triage and have ordered the appropriate study to initiate their work up based on the clinical presentation during my assessment. I have advised that the patient will be accommodated in the Main ED as soon as possible. I have also requested to contact the triage nurse or myself immediately if the patient experiences any changes in their condition during this brief waiting period. Beba Shultz PA-C    Abdominal pain x 1 day. +soft stool, no nausea, vomiting.  + chills. Hx of diverticulitis.   Tender epigastrically to left lower quadrant

## 2018-08-26 NOTE — DISCHARGE INSTRUCTIONS
Diverticulitis: Care Instructions  Your Care Instructions    Diverticulitis occurs when pouches form in the wall of the colon and become inflamed or infected. It can be very painful. Doctors aren't sure what causes diverticulitis. There is no proof that foods such as nuts, seeds, or berries cause it or make it worse. A low-fiber diet may cause the colon to work harder to push stool forward. Pouches may form because of this extra work. It may be hard to think about healthy eating while you're in pain. But as you recover, you might think about how you can use healthy eating for overall better health. Healthy eating may help you avoid future attacks. Follow-up care is a key part of your treatment and safety. Be sure to make and go to all appointments, and call your doctor if you are having problems. It's also a good idea to know your test results and keep a list of the medicines you take. How can you care for yourself at home? · Drink plenty of fluids, enough so that your urine is light yellow or clear like water. If you have kidney, heart, or liver disease and have to limit fluids, talk with your doctor before you increase the amount of fluids you drink. · Stick to liquids or a bland diet (plain rice, bananas, dry toast or crackers, applesauce) until you are feeling better. Then you can return to regular foods and gradually increase the amount of fiber in your diet. · Use a heating pad set on low on your belly to relieve mild cramps and pain. · Get extra rest until you are feeling better. · Be safe with medicines. Read and follow all instructions on the label. ¨ If the doctor gave you a prescription medicine for pain, take it as prescribed. ¨ If you are not taking a prescription pain medicine, ask your doctor if you can take an over-the-counter medicine. · If your doctor prescribed antibiotics, take them as directed. Do not stop taking them just because you feel better.  You need to take the full course of antibiotics. To prevent future attacks of diverticulitis  · Avoid constipation:  ¨ Include fruits, vegetables, beans, and whole grains in your diet each day. These foods are high in fiber. ¨ Drink plenty of fluids, enough so that your urine is light yellow or clear like water. If you have kidney, heart, or liver disease and have to limit fluids, talk with your doctor before you increase the amount of fluids you drink. ¨ Get some exercise every day. Build up slowly to 30 to 60 minutes a day on 5 or more days of the week. ¨ Take a fiber supplement, such as Citrucel or Metamucil, every day if needed. Read and follow all instructions on the label. ¨ Schedule time each day for a bowel movement. Having a daily routine may help. Take your time and do not strain when having a bowel movement. When should you call for help? Call your doctor now or seek immediate medical care if:    · You have a fever.     · You are vomiting.     · You have new or worse belly pain.     · You cannot pass stools or gas.    Watch closely for changes in your health, and be sure to contact your doctor if you have any problems. Where can you learn more? Go to http://evette-delta.info/. Enter H901 in the search box to learn more about \"Diverticulitis: Care Instructions. \"  Current as of: May 12, 2017  Content Version: 11.7  © 2631-6565 [employer]. Care instructions adapted under license by [employer] (which disclaims liability or warranty for this information). If you have questions about a medical condition or this instruction, always ask your healthcare professional. [place] [address] any warranty or liability for your use of this information.

## 2018-08-26 NOTE — MR AVS SNAPSHOT
[address] [facility] [address] 
[phone] Patient: Addi Machuca MRN: [id number] :[id number] Visit Information Date & Time Provider Department Dept. Phone Encounter #  
 2018 10:15 AM [address] [facility] [phone] [id number] Your Appointments 2018  9:30 AM  
ROUTINE CARE with Corazon Dean  [facility] Appt Note: 6 months f/up  18 hb  
 [address] [address]  
[phone]  
  
   
 [address] [address] Upcoming Health Maintenance Date Due Influenza Age 5 to Adult 2018 Pneumococcal 19-64 Highest Risk (3 of 3 - PCV13) 2019 BREAST CANCER SCRN MAMMOGRAM 10/18/2019 PAP AKA CERVICAL CYTOLOGY 2020 COLONOSCOPY 2021 DTaP/Tdap/Td series (2 - Td) 2024 Allergies as of 2018  Review Complete On: 2018 By: Jacoby Mart RN Severity Noted Reaction Type Reactions Latex  2014   Systemic Itching ITCHING, SWELLING Current Immunizations  Reviewed on 2017 Name Date Influenza Vaccine (Quad) PF 2017, 10/26/2016  2:07 PM  
 Influenza Vaccine Split 2010 Pneumococcal Polysaccharide (PPSV-23) 2018  9:39 AM  
 Tdap 2014 Not reviewed this visit You Were Diagnosed With   
  
 Codes Comments Generalized abdominal pain    -  Primary ICD-10-CM: R10.84 ICD-9-CM: 789.07 Hx of diverticulitis of colon     ICD-10-CM: Z87.19 ICD-9-CM: V12.79 Vitals BP Pulse Temp Resp Height(growth percentile) Weight(growth percentile) 139/73 94 99 °F (37.2 °C) 16 5' 1\" (1.549 m) 224 lb (101.6 kg) SpO2 BMI OB Status Smoking Status 98% 42.32 kg/m2 Hysterectomy Never Smoker BMI and BSA Data  Body Mass Index Body Surface Area  
 42.32 kg/m 2 2.09 m 2  
  
  
 Preferred Pharmacy Pharmacy Name Phone [id number] [address] [phone] Your Updated Medication List  
  
   
This list is accurate as of 8/26/18 10:54 AM.  Always use your most recent med list.  
  
  
  
  
 atorvastatin 40 mg tablet Commonly known as:  LIPITOR  
TAKE 1 TAB BY MOUTH DAILY. lisinopril-hydroCHLOROthiazide 20-12.5 mg per tablet Commonly known as:  Kathalene Rockers Take 1 Tab by mouth daily. MULTI-VITAMIN PO Take 1 Tab by mouth daily. ONE A DAY VITAMIN  
  
 VITAMIN D3 1,000 unit Cap Generic drug:  cholecalciferol Take  by mouth daily. We Performed the Following AMB POC URINALYSIS DIP STICK AUTO W/O MICRO [00861 CPT(R)] Patient Instructions Go to one of the following below immediately without delay: 
 
[address]  
([address]) [place]  
[phone] Open 24 hours [address] (EMERGENCY DEPARTMENT) [address]  
[phone] Open 24 hours [facility] (EMERGENCY DEPARTMENT) 
[address] [place] [address]  
[phone] Introducing \[facility]\"" & HEALTH SERVICES! [employer] introduces [employer] patient portal. Now you can access parts of your medical record, email your doctor's office, and request medication refills online. 1. In your internet browser, go to [email or web address] 2. Click on the First Time User? Click Here link in the Sign In box. You will see the New Member Sign Up page. 3. Enter your [employer] Access Code exactly as it appears below. You will not need to use this code after youve completed the sign-up process. If you do not sign up before the expiration date, you must request a new code. · [employer] Access Code: [email or web address] Expires: 11/24/2018 10:54 AM 
 
 4. Enter the last four digits of your Social Security Number (xxxx) and Date of Birth (mm/dd/yyyy) as indicated and click Submit. You will be taken to the next sign-up page. 5. Create a [employer] ID. This will be your [employer] login ID and cannot be changed, so think of one that is secure and easy to remember. 6. Create a [employer] password. You can change your password at any time. 7. Enter your Password Reset Question and Answer. This can be used at a later time if you forget your password. 8. Enter your e-mail address. You will receive e-mail notification when new information is available in [address] 9. Click Sign Up. You can now view and download portions of your medical record. 10. Click the Download Summary menu link to download a portable copy of your medical information. If you have questions, please visit the Frequently Asked Questions section of the [employer] website. Remember, [employer] is NOT to be used for urgent needs. For medical emergencies, dial 911. Now available from your iPhone and Android! Please provide this summary of care documentation to your next provider. Your primary care clinician is listed as Yajaira BASHIR. If you have any questions after today's visit, please call [phone].

## 2018-08-26 NOTE — ED PROVIDER NOTES
HPI Comments: 62 y.o. female with past medical history significant for HTN, elevated CRP, diverticulosis of sigmoid, leukopenia, hypercholesterolemia, bursitis of left hip, adrenal nodule, and arthritis who presents from Urgent Care with chief complaint of abdominal pain. Patient states that she was advised to visit the ED after seeing Urgent Care this morning for abdominal pain. She complains of abdominal pain that began yesterday and got progressively worse overnight and throughout this morning with associated chills. She notes that her pain initially began in her lower abdomen yesterday but that she did not pay much attention to her symptoms. Now, she complains that her pain is \"worse than the epigastrium,\" most severe in her upper abdomen, and has radiated throughout her abdomen - \"forcing her to miss Mandaen. \"  She took ibuprofen yesterday for her symptoms with no relief and has not taken anything today. She states that she has been experiencing loose stool with increased frequency of bowel movements and claims that she previously had a fever of 99F at Urgent Care, which has now resolved. Patient has a h/o diverticulitis but claims that she has not been taking medication for this. Of note, patient states that her last colonoscopy was about 8 years ago. Patient denies dysuria, hematuria, hematochezia, vomiting, and nausea. There are no other acute medical concerns at this time. Social hx: negative tobacco use; rare alcohol use; negative drug use  PCP: Aaron Butler NP    Note written by Jose Ghosh, as dictated by Winter Aguilera MD 11:31 AM        The history is provided by the patient. No  was used.         Past Medical History:   Diagnosis Date    Adrenal [address] [address] [facility] 12/07/2014    NO TREATMENT NEEDED    Adverse effect of anesthesia 10/03/2016    \"TEND TO TAKE A LITTLE LONGER FOR EFFECT TO WEAR OFF\"    Adverse effect of anesthesia     NO KNOWN HISTORY OF ANESTHESIA PROBLEMS IN FAMILY    Arthritis     Bursitis of left hip     Chronic pain     HIP, KNEES    Common migraine 05/11/2010    PT DENIES THIS DIAGNOSIS ON 10/3/16    CRP elevated 5/11/2010    Diverticulosis of sigmoid 05/2010    HAS HAD 2-3 EPISODES OF DIVERTICULITIS, PT STATES ON 10/3/16    Eczema 5/11/2010    Elevated cholesterol 5/11/2010    elevated particle number    Family history of early CAD 5/11/2010    Fe deficiency anemia 5/11/2010    H[address]O [address]ogram 7/12/[address]    [address]    HTN (hypertension) 5/11/2010    Leukopenia     Vitamin D deficiency 8/6/2010       Past Surgical History:   Procedure Laterality Date    ENDOSCOPY, COLON, DIAGNOSTIC  2/25/11    HX ACL RECONSTRUCTION Right 10/2016    HX CHOLECYSTECTOMY  11/01    laparoscopic    HX HERNIA REPAIR  9775    UMBILICAL    HX HYSTERECTOMY  8/98    ovaries spared    HX ORTHOPAEDIC Right 2003    BUNIONECTOMY    HX ORTHOPAEDIC Left 02/2016    TOTAL HIP         Family History:   Problem Relation Age of Onset    Colon Cancer Paternal Grandmother     Cancer Paternal Grandmother     Colon Cancer Paternal Grandfather     Arthritis-osteo Mother     Diabetes Mother     Elevated Lipids Mother     Hypertension Mother     Diabetes Father     Arthritis-osteo Father     Anesth Problems Neg Hx        Social History     Social History    Marital status:      Spouse name: N/A    Number of children: 2    Years of education: N/A     Occupational History    Clerical      Social History Main Topics    Smoking status: Never Smoker    Smokeless tobacco: Never Used    Alcohol use Yes      Comment: rare    Drug use: No    Sexual activity: Yes     Partners: Male     Birth control/ protection: None     Other Topics Concern    Not on file     Social History Narrative         ALLERGIES: Latex    Review of Systems   Constitutional: Positive for chills and fever (Resolved). Negative for activity change.    HENT: Negative for nosebleeds, sore throat, trouble swallowing and voice change. Eyes: Negative for visual disturbance. Respiratory: Negative for shortness of breath. Cardiovascular: Negative for chest pain and palpitations. Gastrointestinal: Positive for abdominal pain and diarrhea (loose). Negative for blood in stool, constipation and nausea. Genitourinary: Negative for difficulty urinating, dysuria, hematuria and urgency. Musculoskeletal: Negative for back pain, neck pain and neck stiffness. Skin: Negative for color change. Allergic/Immunologic: Negative for immunocompromised state. Neurological: Negative for dizziness, seizures, syncope, weakness, light-headedness, numbness and headaches. Psychiatric/Behavioral: Negative for behavioral problems, confusion, hallucinations, self-injury and suicidal ideas. All other systems reviewed and are negative. Vitals:    08/26/18 1124   BP: 135/87   Pulse: 91   Resp: 16   Temp: 98 °F (36.7 °C)   SpO2: 98%   Weight: 104.2 kg (229 lb 12.8 oz)   Height: 5' 1\" (1.549 m)            Physical Exam   Constitutional: She is oriented to person, place, and time. She appears well-developed and well-nourished. No distress. HENT:   Head: Normocephalic and atraumatic. Eyes: Pupils are equal, round, and reactive to light. Neck: Normal range of motion. Neck supple. Cardiovascular: Normal rate, regular rhythm and normal heart sounds. Exam reveals no gallop and no friction rub. No murmur heard. Pulmonary/Chest: Effort normal and breath sounds normal. No respiratory distress. She has no wheezes. Clear breath sounds   Abdominal: Soft. Bowel sounds are normal. She exhibits no distension. There is no tenderness. There is no rebound and no guarding. Musculoskeletal: Normal range of motion. She exhibits no edema (no pedal edema). Neurological: She is alert and oriented to person, place, and time. Skin: Skin is warm. No rash noted. She is not diaphoretic.    Psychiatric: She has a normal mood and affect. Her behavior is normal. Judgment and thought content normal.   Nursing note and vitals reviewed. Note written by Jose Navarro, as dictated by Annemarie Lucas MD 2:32 PM      [facility]      ED Course     This is a 60-year-old female with past medical history, review of systems, physical exam as above, presenting with complaints of worsening abdominal pain, in the setting a previous history of diverticulosis, diverticulitis. Patient states the pain began yesterday, nonfocal, endorses normal bowel movements, denies nausea, vomiting, fevers. Physical exam remarkable for well-appearing middle-aged female in no acute distress, with diffuse mild abdominal tenderness, without rebound or guarding, worse in the epigastrium. Differential includes diverticulosis, diverticulitis, gastritis, pancreatitis. Plan to provide GI cocktail, obtain CMP, CBC, lipase, UA, CT of the abdomen and pelvis. We will reassess, and make a disposition based the patient's diagnostics and response to therapy. Procedures    PROGRESS NOTE:  2:32 PM  Abdominal pain has improved. CT of abdomen with mild diverticulitis. Unremarkable labwork. Will discharge home with oral antibiotics, PCP follow-up, and return precautions. 2:33 PM  Patient's results have been reviewed with them. Patient and/or family have verbally conveyed their understanding and agreement of the patient's signs, symptoms, diagnosis, treatment and prognosis and additionally agree to follow up as recommended or return to the Emergency Room should their condition change prior to follow-up. Discharge instructions have also been provided to the patient with some educational information regarding their diagnosis as well a list of reasons why they would want to return to the ER prior to their follow-up appointment should their condition change.

## 2018-08-27 ENCOUNTER — PATIENT OUTREACH (OUTPATIENT)
Dept: FAMILY MEDICINE CLINIC | Age: 58
End: 2018-08-27

## 2018-08-27 NOTE — PROGRESS NOTES
Outbound call to patient to follow-up for ed visit on 8/26/18 to [facility] for abdominal pain. NN unable to reach patient at this encounter. Message left on Vm with NN contact information. GIT letter sent.

## 2018-08-27 NOTE — LETTER
8/27/2018 11:50 AM 
 
Ms. Kenny Griffiths [address] [address] Dear Ms. Coelho: 
 
Please call our office at [phone] and schedule a follow up appointment for your continued care. Your name was listed on our hospital/ED/UC discharge list. I am a Nurse Navigator working with the providers here at [facility]. Part of my job is to follow up with patients who have been in the emergency department, hospital, or  to see how they are feeling, answer any questions they may have about their visit and also make sure they have a follow-up appointment to see their primary care doctor. We wanted to make sure that you scheduled a follow-up appointment to come in and talk to us about your recent visit. Jania Ruiz Please call our office to schedule an appointment. In the meantime, if you have any questions or concerns, please feel free to call me on my direct line at [id number] [phone]. Thank you for allowing us to participate in your care! Sincerely, Adrienne Groves RN

## 2019-02-14 DIAGNOSIS — I10 HTN (HYPERTENSION), BENIGN: ICD-10-CM

## 2019-02-15 RX ORDER — ATORVASTATIN CALCIUM 40 MG/1
TABLET, FILM COATED ORAL
Qty: 90 TAB | Refills: 0 | Status: SHIPPED | OUTPATIENT
Start: 2019-02-15 | End: 2019-04-24 | Stop reason: SDUPTHER

## 2019-02-15 RX ORDER — LISINOPRIL AND HYDROCHLOROTHIAZIDE 12.5; 2 MG/1; MG/1
1 TABLET ORAL DAILY
Qty: 90 TAB | Refills: 0 | Status: SHIPPED | OUTPATIENT
Start: 2019-02-15 | End: 2019-04-24 | Stop reason: SDUPTHER

## 2019-04-24 ENCOUNTER — OFFICE VISIT (OUTPATIENT)
Dept: FAMILY MEDICINE CLINIC | Age: 59
End: 2019-04-24

## 2019-04-24 VITALS
BODY MASS INDEX: 43.76 KG/M2 | SYSTOLIC BLOOD PRESSURE: 150 MMHG | HEART RATE: 66 BPM | HEIGHT: 61 IN | DIASTOLIC BLOOD PRESSURE: 88 MMHG | WEIGHT: 231.8 LBS | RESPIRATION RATE: 16 BRPM | OXYGEN SATURATION: 100 % | TEMPERATURE: 97.4 F

## 2019-04-24 DIAGNOSIS — I10 HTN (HYPERTENSION), BENIGN: ICD-10-CM

## 2019-04-24 DIAGNOSIS — E78.00 ELEVATED CHOLESTEROL: Primary | ICD-10-CM

## 2019-04-24 RX ORDER — ATORVASTATIN CALCIUM 40 MG/1
TABLET, FILM COATED ORAL
Qty: 90 TAB | Refills: 1 | Status: SHIPPED | OUTPATIENT
Start: 2019-04-24 | End: 2020-01-15

## 2019-04-24 RX ORDER — LISINOPRIL AND HYDROCHLOROTHIAZIDE 12.5; 2 MG/1; MG/1
1 TABLET ORAL DAILY
Qty: 90 TAB | Refills: 1 | Status: SHIPPED | OUTPATIENT
Start: 2019-04-24 | End: 2020-01-15

## 2019-04-24 NOTE — PROGRESS NOTES
HISTORY OF PRESENT ILLNESS Donaldo Rankin is a 61 y.o. female. HPI: Cardiovascular Review The patient has hypertension, hyperlipidemia and morbid obesity. She reports taking medications as instructed, except she ran out of both of her medications last week  no medication side effects noted. Diet and Lifestyle: generally follows a low fat low cholesterol diet, generally follows a low sodium diet, no formal exercise but active during the day. Lab review: labs reviewed and discussed with patient. Past Medical History:  
Diagnosis Date  Adrenal nodule ([address] 12/07/2014 NO TREATMENT NEEDED  Adverse effect of anesthesia 10/03/2016 \"TEND TO TAKE A LITTLE LONGER FOR EFFECT TO WEAR OFF\"  Adverse effect of anesthesia NO KNOWN HISTORY OF ANESTHESIA PROBLEMS IN FAMILY  Arthritis  Bursitis of left hip  [facility]  Common migraine 05/11/2010 PT DENIES THIS DIAGNOSIS ON 10/3/16  CRP elevated 5/11/2010  Diverticulosis of sigmoid 05/2010 HAS HAD 2-3 EPISODES OF DIVERTICULITIS, PT STATES ON 10/3/16  Eczema 5/11/2010  Elevated cholesterol 5/11/2010  
 elevated particle number  Family history of early CAD 5/11/2010  Fe deficiency anemia 5/11/2010  
 H/O mammogram 7/12/2012 [facility]  HTN (hypertension) 5/11/2010  Leukopenia  Vitamin D deficiency 8/6/2010 Past Surgical History:  
Procedure Laterality Date  ENDOSCOPY, COLON, DIAGNOSTIC  2/25/11  
 HX ACL RECONSTRUCTION Right 10/2016  HX CHOLECYSTECTOMY  11/01  
 laparoscopic [address] UMBILICAL  
 HX HYSTERECTOMY  8/98 ovaries spared  HX ORTHOPAEDIC Right 2003 BUNIONECTOMY  HX ORTHOPAEDIC Left 02/2016 TOTAL HIP Allergies Allergen Reactions  Latex Itching ITCHING, SWELLING Current Outpatient Medications:  
  atorvastatin (LIPITOR) 40 mg tablet, TAKE 1 TABLET BY MOUTH EVERY DAY, Disp: 90 Tab, Rfl: 1   lisinopril-hydroCHLOROthiazide (PRINZIDE, ZESTORETIC) 20-12.5 mg per tablet, Take 1 Tab by mouth daily. , Disp: 90 Tab, Rfl: 1   cholecalciferol (VITAMIN D3) 1,000 unit cap, Take  by mouth daily. , Disp: , Rfl:  
  MULTI-VITAMIN PO, Take 1 Tab by mouth daily. ONE A DAY VITAMIN, Disp: , Rfl:  
Review of Systems Constitutional: Negative. Respiratory: Negative. Cardiovascular: Negative. Gastrointestinal: Negative. Blood pressure 150/88, pulse 66, temperature 97.4 °F (36.3 °C), temperature source Oral, resp. rate 16, height 5' 1\" (1.549 m), weight 231 lb 12.8 oz (105.1 kg), SpO2 100 %. Body mass index is 43.8 kg/m². Physical Exam  
Constitutional: No distress. HENT:  
Mouth/Throat: Oropharynx is clear and moist.  
Neck: Normal range of motion. Neck supple. Cardiovascular: Normal rate and regular rhythm. No murmur heard. Pulmonary/Chest: Effort normal and breath sounds normal.  
Abdominal: Soft. Bowel sounds are normal.  
Skin: Skin is warm and dry. Nursing note and vitals reviewed. ASSESSMENT and PLAN Diagnoses and all orders for this visit: 1. Elevated cholesterol -     Resume atorvastatin (LIPITOR) 40 mg tablet; TAKE 1 TABLET BY MOUTH EVERY DAY 
-     LIPID PANEL 2. HTN (hypertension), benign -    Resume  lisinopril-hydroCHLOROthiazide (PRINZIDE, ZESTORETIC) 20-12.5 mg per tablet; Take 1 Tab by mouth daily. 
-     METABOLIC PANEL, COMPREHENSIVE 
-     CBC W/O DIFF 3. BMI 40.0-44.9, adult ([facility] Diet and exercise Follow up in July/august for general medical exam 
.Pt was given an after visit summary which includes diagnosis, current medicines and vital and voiced understanding of treatment plan

## 2019-04-24 NOTE — PROGRESS NOTES
Chief Complaint Patient presents with  Hypertension  
   follow up  Cholesterol Problem  
   follow up.  Labs  
  fasting 1. Have you been to the ER, urgent care clinic since your last visit? Hospitalized since your last visit? No 
 
2. Have you seen or consulted any other health care providers outside of the [address] [place] [address] since your last visit? Include any pap smears or colon screening.  No

## 2019-04-25 LAB
ALBUMIN SERPL-MCNC: 3.9 G/DL (ref 3.5–5.5)
ALBUMIN/GLOB SERPL: 1.3 {RATIO} (ref 1.2–2.2)
ALP SERPL-CCNC: 72 IU/L (ref 39–117)
ALT SERPL-CCNC: 21 IU/L (ref 0–32)
AST SERPL-CCNC: 22 IU/L (ref 0–40)
BILIRUB SERPL-MCNC: 0.3 MG/DL (ref 0–1.2)
BUN SERPL-MCNC: 9 MG/DL (ref 6–24)
BUN/CREAT SERPL: 14 (ref 9–23)
CALCIUM SERPL-MCNC: 8.9 MG/DL (ref 8.7–10.2)
CHLORIDE SERPL-SCNC: 108 MMOL/L (ref 96–106)
CHOLEST SERPL-MCNC: 189 MG/DL (ref 100–199)
CO2 SERPL-SCNC: 23 MMOL/L (ref 20–29)
CREAT SERPL-MCNC: 0.66 MG/DL (ref 0.57–1)
ERYTHROCYTE [DISTWIDTH] IN BLOOD BY AUTOMATED COUNT: 14.7 % (ref 12.3–15.4)
GLOBULIN SER CALC-MCNC: 3 G/DL (ref 1.5–4.5)
GLUCOSE SERPL-MCNC: 101 MG/DL (ref 65–99)
HCT VFR BLD AUTO: 36 % (ref 34–46.6)
HDLC SERPL-MCNC: 48 MG/DL
HGB BLD-MCNC: 11.9 G/DL (ref 11.1–15.9)
INTERPRETATION, 910389: NORMAL
LDLC SERPL CALC-MCNC: 111 MG/DL (ref 0–99)
MCH RBC QN AUTO: 29.2 PG (ref 26.6–33)
MCHC RBC AUTO-ENTMCNC: 33.1 G/DL (ref 31.5–35.7)
MCV RBC AUTO: 88 FL (ref 79–97)
PLATELET # BLD AUTO: 218 X10E3/UL (ref 150–379)
POTASSIUM SERPL-SCNC: 3.9 MMOL/L (ref 3.5–5.2)
PROT SERPL-MCNC: 6.9 G/DL (ref 6–8.5)
RBC # BLD AUTO: 4.08 X10E6/UL (ref 3.77–5.28)
SODIUM SERPL-SCNC: 144 MMOL/L (ref 134–144)
TRIGL SERPL-MCNC: 149 MG/DL (ref 0–149)
VLDLC SERPL CALC-MCNC: 30 MG/DL (ref 5–40)
WBC # BLD AUTO: 3 X10E3/UL (ref 3.4–10.8)

## 2019-08-04 ENCOUNTER — OFFICE VISIT (OUTPATIENT)
Dept: URGENT CARE | Age: 59
End: 2019-08-04

## 2019-08-04 VITALS
OXYGEN SATURATION: 97 % | BODY MASS INDEX: 42.67 KG/M2 | WEIGHT: 226 LBS | RESPIRATION RATE: 18 BRPM | HEIGHT: 61 IN | TEMPERATURE: 98.7 F | DIASTOLIC BLOOD PRESSURE: 85 MMHG | HEART RATE: 90 BPM | SYSTOLIC BLOOD PRESSURE: 138 MMHG

## 2019-08-04 DIAGNOSIS — J06.9 UPPER RESPIRATORY TRACT INFECTION, UNSPECIFIED TYPE: ICD-10-CM

## 2019-08-04 DIAGNOSIS — J40 BRONCHITIS: Primary | ICD-10-CM

## 2019-08-04 RX ORDER — PREDNISONE 5 MG/1
TABLET ORAL
Qty: 21 TAB | Refills: 0 | Status: SHIPPED | OUTPATIENT
Start: 2019-08-04 | End: 2020-10-26

## 2019-08-04 RX ORDER — BENZONATATE 200 MG/1
200 CAPSULE ORAL
Qty: 21 CAP | Refills: 0 | Status: SHIPPED | OUTPATIENT
Start: 2019-08-04 | End: 2019-08-11

## 2019-08-04 RX ORDER — MINERAL OIL
180 ENEMA (ML) RECTAL DAILY
Qty: 30 TAB | Refills: 0 | Status: SHIPPED | OUTPATIENT
Start: 2019-08-04 | End: 2020-10-26

## 2019-08-04 NOTE — LETTER
NOTIFICATION RETURN TO WORK / SCHOOL 
 
8/4/2019 6:30 PM 
 
Ms. Dainela Ayala [address] [id number] To Whom It May Concern: 
 
Daniela Ayala is currently under the care of [facility]. She will return to work/school on: 8/7/19 If there are questions or concerns please have the patient contact our office. Sincerely, Jann Sigala

## 2019-08-04 NOTE — PROGRESS NOTES
Dry irritating cough since Thurs and increased frequency with some nasal congestion and stuffiness for a day. NO FCNV SOB or chest pain. Benadryl helped some last night    The history is provided by the patient.         Past Medical History:   Diagnosis Date    Adrenal nodule ([address] 12/07/2014    NO TREATMENT NEEDED    Adverse effect of anesthesia 10/03/2016    \"TEND TO TAKE A LITTLE LONGER FOR EFFECT TO WEAR OFF\"    Adverse effect of anesthesia     NO KNOWN HISTORY OF ANESTHESIA PROBLEMS IN FAMILY    Arthritis     Bursitis of left hip     Chronic pain     HIP, KNEES    Common migraine 05/11/2010    PT DENIES THIS DIAGNOSIS ON 10/3/16    CRP elevated 5/11/2010    Diverticulosis of sigmoid 05/2010    HAS HAD 2-3 EPISODES OF DIVERTICULITIS, PT STATES ON 10/3/16    Eczema 5/11/2010    Elevated cholesterol 5/11/2010    elevated particle number    Family history of early CAD 5/11/2010    Fe deficiency anemia 5/11/2010    H[facility] 7/12/2012    [facility]    HTN (hypertension) 5/11/2010    Leukopenia     Vitamin D deficiency 8/6/2010        Past Surgical History:   Procedure Laterality Date    ENDOSCOPY, COLON, DIAGNOSTIC  2/25/11    HX ACL RECONSTRUCTION Right 10/2016    HX CHOLECYSTECTOMY  11/01    laparoscopic    HX HERNIA REPAIR  6430    UMBILICAL    HX HYSTERECTOMY  8/98    ovaries spared    HX ORTHOPAEDIC Right 2003    BUNIONECTOMY    HX ORTHOPAEDIC Left 02/2016    TOTAL HIP         Family History   Problem Relation Age of Onset    Colon Cancer Paternal Grandmother     Cancer Paternal Grandmother     Colon Cancer Paternal Grandfather     Arthritis-osteo Mother     Diabetes Mother     Elevated Lipids Mother     Hypertension Mother     Diabetes Father     Arthritis-osteo Father     Anesth Problems Neg Hx         Social History     Socioeconomic History    Marital status:      Spouse name: Not on file    Number of children: 2    Years of education: Not on file  Highest education level: Not on file   Occupational History    Occupation: Clerical   Social Needs    Financial resource strain: Not on file    Food insecurity:     Worry: Not on file     Inability: Not on file    Transportation needs:     Medical: Not on file     Non-medical: Not on file   Tobacco Use    Smoking status: Never Smoker    Smokeless tobacco: Never Used   Substance and Sexual Activity    Alcohol use: Yes     Comment: rare    Drug use: No    Sexual activity: Yes     Partners: Male     Birth control/protection: None   Lifestyle    Physical activity:     Days per week: Not on file     Minutes per session: Not on file    Stress: Not on file   Relationships    Social connections:     Talks on phone: Not on file     Gets together: Not on file     Attends Protestant service: Not on file     Active member of club or organization: Not on file     Attends meetings of clubs or organizations: Not on file     Relationship status: Not on file    Intimate partner violence:     Fear of current or ex partner: Not on file     Emotionally abused: Not on file     Physically abused: Not on file     Forced sexual activity: Not on file   Other Topics Concern    Not on file   Social History Narrative    Not on file                ALLERGIES: Latex    Review of Systems   Constitutional: Negative. HENT: Positive for congestion and postnasal drip. Negative for sinus pressure, sinus pain and sore throat (some irritation with cough). Respiratory: Positive for cough. Negative for shortness of breath and wheezing. Cardiovascular: Negative. Gastrointestinal: Negative for diarrhea (some loose nonbloody stools yesterday but she thinks that was nerves-daughter was having a  baby shower. None today). Musculoskeletal: Negative. Skin: Negative. Neurological: Negative.         Vitals:    08/04/19 1810   BP: 147/89   Pulse: 90   Resp: 18   Temp: 98.7 °F (37.1 °C)   SpO2: 97%   Weight: 226 lb (102.5 kg) Height: 5' 1\" (1.549 m)       Physical Exam   Constitutional: She is oriented to person, place, and time. She appears well-developed and well-nourished. No distress. Nontoxic, cooperative   HENT:   Head: Normocephalic and atraumatic. Mouth/Throat: Oropharynx is clear and moist. No oropharyngeal exudate. Nasal congestion, No sinus pain, PND, BL fluid behind the TM's without erythema  Redness and irritation from rubbing around the nares BL  Clear nasal drainage    Eyes: Conjunctivae and EOM are normal. Right eye exhibits no discharge. Left eye exhibits no discharge. No scleral icterus. Neck: Normal range of motion. No tracheal deviation present. No thyromegaly present. Cardiovascular: Normal rate, regular rhythm and normal heart sounds. No murmur heard. Pulmonary/Chest: Effort normal and breath sounds normal. No respiratory distress. She has no wheezes. She has no rales. Dry frequent cough but respirations nonlabored. , speaking in full sentences, No retractions   Lymphadenopathy:     She has no cervical adenopathy. Neurological: She is alert and oriented to person, place, and time. No cranial nerve deficit. Coordination normal.   Skin: Skin is warm. No erythema. Psychiatric: She has a normal mood and affect. Her behavior is normal. Judgment and thought content normal.   Nursing note and vitals reviewed. MDM    ICD-10-CM ICD-9-CM    1. Bronchitis J40 490    2. Upper respiratory tract infection, unspecified type J06.9 465.9      Medications Ordered Today   Medications    predniSONE (STERAPRED) 5 mg dose pack     Sig: See administration instruction per 5mg dose pack     Dispense:  21 Tab     Refill:  0    benzonatate (TESSALON) 200 mg capsule     Sig: Take 1 Cap by mouth three (3) times daily as needed for Cough for up to 7 days. Dispense:  21 Cap     Refill:  0    fexofenadine (ALLEGRA) 180 mg tablet     Sig: Take 1 Tab by mouth daily.      Dispense:  30 Tab     Refill:  0     The patients condition was discussed with the patient and they understand. The patient is to follow up with primary care doctor ,If signs and symptoms become worse the pt is to go to the ER. The patient is to take medications as prescribed.                Procedures

## 2019-08-04 NOTE — PATIENT INSTRUCTIONS
Rest and seek medical care for increased problems, any questions or concern including but not limited to the ones discussed with you here today. Drink plenty of fluids, take zinc cold remedy 2-3 times a day, use saline nasal spray, muccinex as directed and,  honey and lemon tea. Bronchitis: Care Instructions  Your Care Instructions    Bronchitis is inflammation of the bronchial tubes, which carry air to the lungs. The tubes swell and produce mucus, or phlegm. The mucus and inflamed bronchial tubes make you cough. You may have trouble breathing. Most cases of bronchitis are caused by viruses like those that cause colds. Antibiotics usually do not help and they may be harmful. Bronchitis usually develops rapidly and lasts about 2 to 3 weeks in otherwise healthy people. Follow-up care is a key part of your treatment and safety. Be sure to make and go to all appointments, and call your doctor if you are having problems. It's also a good idea to know your test results and keep a list of the medicines you take. How can you care for yourself at home? · Take all medicines exactly as prescribed. Call your doctor if you think you are having a problem with your medicine. · Get some extra rest.  · Take an over-the-counter pain medicine, such as acetaminophen (Tylenol), ibuprofen (Advil, Motrin), or naproxen (Aleve) to reduce fever and relieve body aches. Read and follow all instructions on the label. · Do not take two or more pain medicines at the same time unless the doctor told you to. Many pain medicines have acetaminophen, which is Tylenol. Too much acetaminophen (Tylenol) can be harmful. · Take an over-the-counter cough medicine that contains dextromethorphan to help quiet a dry, hacking cough so that you can sleep. Avoid cough medicines that have more than one active ingredient. Read and follow all instructions on the label. · Breathe moist air from a humidifier, hot shower, or sink filled with hot water. The heat and moisture will thin mucus so you can cough it out. · Do not smoke. Smoking can make bronchitis worse. If you need help quitting, talk to your doctor about stop-smoking programs and medicines. These can increase your chances of quitting for good. When should you call for help? Call 911 anytime you think you may need emergency care. For example, call if:    · You have severe trouble breathing.    Call your doctor now or seek immediate medical care if:    · You have new or worse trouble breathing.     · You cough up dark brown or bloody mucus (sputum).     · You have a new or higher fever.     · You have a new rash.    Watch closely for changes in your health, and be sure to contact your doctor if:    · You cough more deeply or more often, especially if you notice more mucus or a change in the color of your mucus.     · You are not getting better as expected. Where can you learn more? Go to http://evette-delta.info/. Enter H333 in the search box to learn more about \"Bronchitis: Care Instructions. \"  Current as of: September 5, 2018  Content Version: 12.1  © 8950-5363 [employer]. Care instructions adapted under license by [employer] (which disclaims liability or warranty for this information). If you have questions about a medical condition or this instruction, always ask your healthcare professional. [address] any warranty or liability for your use of this information.

## 2019-08-14 ENCOUNTER — OFFICE VISIT (OUTPATIENT)
Dept: FAMILY MEDICINE CLINIC | Age: 59
End: 2019-08-14

## 2019-08-14 VITALS
OXYGEN SATURATION: 95 % | SYSTOLIC BLOOD PRESSURE: 120 MMHG | RESPIRATION RATE: 18 BRPM | DIASTOLIC BLOOD PRESSURE: 65 MMHG | BODY MASS INDEX: 42.82 KG/M2 | HEART RATE: 76 BPM | TEMPERATURE: 96 F | WEIGHT: 226.8 LBS | HEIGHT: 61 IN

## 2019-08-14 DIAGNOSIS — I10 HTN (HYPERTENSION), BENIGN: ICD-10-CM

## 2019-08-14 DIAGNOSIS — R73.02 GLUCOSE INTOLERANCE (IMPAIRED GLUCOSE TOLERANCE): ICD-10-CM

## 2019-08-14 DIAGNOSIS — Z12.31 VISIT FOR SCREENING MAMMOGRAM: ICD-10-CM

## 2019-08-14 DIAGNOSIS — Z00.00 GENERAL MEDICAL EXAM: Primary | ICD-10-CM

## 2019-08-14 DIAGNOSIS — E78.00 ELEVATED CHOLESTEROL: ICD-10-CM

## 2019-08-14 NOTE — PROGRESS NOTES
Chief Complaint   Patient presents with    Complete Physical     Patient fasting     1. Have you been to the ER, urgent care clinic since your last visit? Hospitalized since your last visit? Yes Where: Urgent Care 8/4/2019 DX: Bronchtis    2. Have you seen or consulted any other health care providers outside of the [address] [place] [address] since your last visit? Include any pap smears or colon screening.  No

## 2019-08-14 NOTE — PROGRESS NOTES
Advance Care Planning    Advance Care Planning (ACP) Provider Note - Comprehensive     Date of ACP Conversation: 10/02/17  Persons included in Conversation:  patient  Length of ACP Conversation in minutes:  16 minutes    Authorized Decision Maker (if patient is incapable of making informed decisions): This person is: wife  Healthcare Agent/Medical Power of  under Advance Directive          General ACP for ALL Patients with Decision Making Capacity:   Importance of advance care planning, including choosing a healthcare agent to communicate patient's healthcare decisions if patient lost the ability to make decisions, such as after a sudden illness or accident  Understanding of the healthcare agent role was assessed and information provided  Exploration of values, goals, and preferences if recovery is not expected, even with continued medical treatment in the event of: Imminent death  Severe, permanent brain injury    Review of Existing Advance Directive: For Serious or Chronic Illness:  Understanding of medical condition    Understanding of CPR, goals and expected outcomes, benefits and burdens discussed.     Interventions Provided:  Recommended communicating the plan and making copies for the healthcare agent, personal physician, and others as appropriate (e.g., health system)  Recommended review of completed ACP document annually or upon change in health status Subjective:   61 y.o. female for Well Woman Check. Her gyne and breast care is done elsewhere by her Ob-Gyne physician. Patient Active Problem List    Diagnosis Date Noted    Obesity, morbid ([facility] [address] 11/29/2017    HTN (hypertension), benign 11/29/2017    Glucose intolerance (impaired glucose tolerance) 11/29/2017    Status post total right knee replacement 12/14/2016    Primary osteoarthritis of right knee 10/24/2016    History of total left hip replacement 07/21/2016    Avascular necrosis of bone of left hip ([facility] 06/24/2016    Primary osteoarthritis of left hip 02/29/2016    Leukopenia     Vitamin D deficiency     Diverticulosis of sigmoid     Eczema 05/11/2010    Family history of early CAD 05/11/2010    Elevated cholesterol 05/11/2010    CRP elevated 05/11/2010    Common migraine 05/11/2010     Current Outpatient Medications   Medication Sig Dispense Refill    fexofenadine (ALLEGRA) 180 mg tablet Take 1 Tab by mouth daily. 30 Tab 0    atorvastatin (LIPITOR) 40 mg tablet TAKE 1 TABLET BY MOUTH EVERY DAY 90 Tab 1    lisinopril-hydroCHLOROthiazide (PRINZIDE, ZESTORETIC) 20-12.5 mg per tablet Take 1 Tab by mouth daily. 90 Tab 1    cholecalciferol (VITAMIN D3) 1,000 unit cap Take  by mouth daily.  MULTI-VITAMIN PO Take 1 Tab by mouth daily.  ONE A DAY VITAMIN      predniSONE (STERAPRED) 5 mg dose pack See administration instruction per 5mg dose pack 21 Tab 0     Allergies   Allergen Reactions    Latex Itching     ITCHING, SWELLING     Past Medical History:   Diagnosis Date    [facility] 12/07/2014    NO TREATMENT NEEDED    Adverse effect of anesthesia 10/03/2016    \"TEND TO TAKE A LITTLE LONGER FOR EFFECT TO WEAR OFF\"    Adverse effect of anesthesia     NO KNOWN HISTORY OF ANESTHESIA PROBLEMS IN FAMILY    Arthritis     Bursitis of left hip     Chronic pain     HIP, KNEES    Common migraine 05/11/2010    PT DENIES THIS DIAGNOSIS ON 10/3/16    CRP elevated 5/11/2010    Diverticulosis of sigmoid 05/2010    HAS HAD 2-3 EPISODES OF DIVERTICULITIS, PT STATES ON 10/3/16    Eczema 5/11/2010    Elevated cholesterol 5/11/2010    elevated particle number    Family history of early CAD 5/11/2010    Fe deficiency anemia 5/11/2010    H[address]O [address] 7/12/[address]    [address]    HTN (hypertension) 5/11/2010    Leukopenia     Vitamin D deficiency 8/6/2010     Past Surgical History:   Procedure Laterality Date    ENDOSCOPY, COLON, DIAGNOSTIC  2/25/11    HX ACL RECONSTRUCTION Right 10/2016    HX CHOLECYSTECTOMY  11/01    laparoscopic    HX HERNIA REPAIR  7533    UMBILICAL    HX HYSTERECTOMY  8/98    ovaries spared    HX ORTHOPAEDIC Right 2003    BUNIONECTOMY    HX ORTHOPAEDIC Left 02/2016    TOTAL HIP     Family History   Problem Relation Age of Onset    Colon Cancer Paternal Grandmother     Cancer Paternal Grandmother     Colon Cancer Paternal Grandfather     Arthritis-osteo Mother     Diabetes Mother     Elevated Lipids Mother     Hypertension Mother     Diabetes Father     Arthritis-osteo Father     Anesth Problems Neg Hx      Social History     Tobacco Use    Smoking status: Never Smoker    Smokeless tobacco: Never Used   Substance Use Topics    Alcohol use: Yes     Comment: rare      Specific concerns today:needs mammogram orders    Review of Systems  Review of systems not obtained due to patient factors. bilateral foot pain after working 4 -5 days  BMI: 42.85    Objective:   Blood pressure 120/65, pulse 76, temperature 96 °F (35.6 °C), temperature source Oral, resp. rate 18, height 5' 1\" (1.549 m), weight 226 lb 12.8 oz (102.9 kg), SpO2 95 %.    Physical Examination:   General appearance - alert, and in no distress  Mental status - alert, oriented to person, place, and time  Eyes - pupils equal and reactive, extraocular eye movements intact  Ears - bilateral TM's and external ear canals normal  Nose - normal and patent, no erythema, discharge or polyps  Mouth - mucous membranes moist, pharynx normal without lesions  Neck - supple, no significant adenopathy  Lymphatics - no palpable lymphadenopathy, no hepatosplenomegaly  Chest - clear to auscultation, no wheezes, rales or rhonchi, symmetric air entry  Heart - normal rate, regular rhythm, normal S1, S2, no murmurs, rubs, clicks or gallops  Abdomen - soft, nontender, nondistended, no masses or organomegaly  Back exam - full range of motion, no tenderness, palpable spasm or pain on motion  Neurological - alert, oriented, normal speech, no focal findings or movement disorder noted  Musculoskeletal - no joint tenderness, deformity or swelling     Assessment/Plan:     lose weight, increase physical activity, follow low fat diet, follow low salt diet, routine labs ordered    ICD-10-CM ICD-9-CM    1. General medical exam Z00.00 V70.9 TSH 3RD GENERATION      T4, FREE   2. HTN (hypertension), benign G69 167.3 METABOLIC PANEL, COMPREHENSIVE      CBC W/O DIFF   3. Elevated cholesterol E78.00 272.0 LIPID PANEL   4. BMI 40.0-44.9, adult ([facility] [address] [facility] Z68.41 V85.41    5. Glucose intolerance (impaired glucose tolerance) R73.02 790.22 HEMOGLOBIN A1C WITH EAG   6.  Visit for screening mammogram Z12.31 V76.12 NUVIA MAMMOGRAM DIAG BILAT SAME DAY INCL CAD   follow up in six months/ Feb

## 2019-08-15 LAB
ALBUMIN SERPL-MCNC: 4.1 G/DL (ref 3.5–5.5)
ALBUMIN/GLOB SERPL: 1.4 {RATIO} (ref 1.2–2.2)
ALP SERPL-CCNC: 90 IU/L (ref 39–117)
ALT SERPL-CCNC: 38 IU/L (ref 0–32)
AST SERPL-CCNC: 26 IU/L (ref 0–40)
BILIRUB SERPL-MCNC: 0.5 MG/DL (ref 0–1.2)
BUN SERPL-MCNC: 12 MG/DL (ref 6–24)
BUN/CREAT SERPL: 19 (ref 9–23)
CALCIUM SERPL-MCNC: 8.9 MG/DL (ref 8.7–10.2)
CHLORIDE SERPL-SCNC: 103 MMOL/L (ref 96–106)
CHOLEST SERPL-MCNC: 156 MG/DL (ref 100–199)
CO2 SERPL-SCNC: 23 MMOL/L (ref 20–29)
CREAT SERPL-MCNC: 0.63 MG/DL (ref 0.57–1)
ERYTHROCYTE [DISTWIDTH] IN BLOOD BY AUTOMATED COUNT: 14.9 % (ref 12.3–15.4)
EST. AVERAGE GLUCOSE BLD GHB EST-MCNC: 146 MG/DL
GLOBULIN SER CALC-MCNC: 2.9 G/DL (ref 1.5–4.5)
GLUCOSE SERPL-MCNC: 124 MG/DL (ref 65–99)
HBA1C MFR BLD: 6.7 % (ref 4.8–5.6)
HCT VFR BLD AUTO: 40.7 % (ref 34–46.6)
HDLC SERPL-MCNC: 49 MG/DL
HGB BLD-MCNC: 13.3 G/DL (ref 11.1–15.9)
INTERPRETATION, 910389: NORMAL
LDLC SERPL CALC-MCNC: 81 MG/DL (ref 0–99)
MCH RBC QN AUTO: 28.9 PG (ref 26.6–33)
MCHC RBC AUTO-ENTMCNC: 32.7 G/DL (ref 31.5–35.7)
MCV RBC AUTO: 88 FL (ref 79–97)
PLATELET # BLD AUTO: 217 X10E3/UL (ref 150–450)
POTASSIUM SERPL-SCNC: 4.2 MMOL/L (ref 3.5–5.2)
PROT SERPL-MCNC: 7 G/DL (ref 6–8.5)
RBC # BLD AUTO: 4.61 X10E6/UL (ref 3.77–5.28)
SODIUM SERPL-SCNC: 141 MMOL/L (ref 134–144)
T4 FREE SERPL-MCNC: 1.06 NG/DL (ref 0.82–1.77)
TRIGL SERPL-MCNC: 130 MG/DL (ref 0–149)
TSH SERPL DL<=0.005 MIU/L-ACNC: 1.43 UIU/ML (ref 0.45–4.5)
VLDLC SERPL CALC-MCNC: 26 MG/DL (ref 5–40)
WBC # BLD AUTO: 3.6 X10E3/UL (ref 3.4–10.8)

## 2020-01-15 DIAGNOSIS — E78.00 ELEVATED CHOLESTEROL: ICD-10-CM

## 2020-01-15 DIAGNOSIS — I10 HTN (HYPERTENSION), BENIGN: ICD-10-CM

## 2020-01-15 RX ORDER — ATORVASTATIN CALCIUM 40 MG/1
TABLET, FILM COATED ORAL
Qty: 90 TAB | Refills: 0 | Status: SHIPPED | OUTPATIENT
Start: 2020-01-15 | End: 2020-07-27 | Stop reason: SDUPTHER

## 2020-01-15 RX ORDER — LISINOPRIL AND HYDROCHLOROTHIAZIDE 12.5; 2 MG/1; MG/1
1 TABLET ORAL DAILY
Qty: 90 TAB | Refills: 0 | Status: SHIPPED | OUTPATIENT
Start: 2020-01-15 | End: 2020-03-16

## 2020-02-17 ENCOUNTER — OFFICE VISIT (OUTPATIENT)
Dept: FAMILY MEDICINE CLINIC | Age: 60
End: 2020-02-17

## 2020-02-17 VITALS
HEIGHT: 61 IN | TEMPERATURE: 98 F | DIASTOLIC BLOOD PRESSURE: 80 MMHG | SYSTOLIC BLOOD PRESSURE: 110 MMHG | RESPIRATION RATE: 18 BRPM | WEIGHT: 228.4 LBS | HEART RATE: 77 BPM | BODY MASS INDEX: 43.12 KG/M2 | OXYGEN SATURATION: 98 %

## 2020-02-17 DIAGNOSIS — R73.02 GLUCOSE INTOLERANCE (IMPAIRED GLUCOSE TOLERANCE): ICD-10-CM

## 2020-02-17 DIAGNOSIS — I10 HTN (HYPERTENSION), BENIGN: Primary | ICD-10-CM

## 2020-02-17 DIAGNOSIS — E66.01 OBESITY, MORBID (HCC): ICD-10-CM

## 2020-02-17 DIAGNOSIS — E78.00 ELEVATED CHOLESTEROL: ICD-10-CM

## 2020-02-17 NOTE — PROGRESS NOTES
HISTORY OF PRESENT ILLNESS  Kenny De La Rosa is a 61 y.o. female. HPI:Cardiovascular Review  The patient has hypertension, hyperlipidemia and morbid obesity. She reports taking medications as instructed, no medication side effects noted. Diet and Lifestyle: generally follows a low fat low cholesterol diet, generally follows a low sodium diet, no formal exercise but active during the day. Lab review: labs reviewed and discussed with patient. Past Medical History:   Diagnosis Date    Adrenal nodule ([address] 12/07/2014    NO TREATMENT NEEDED    Adverse effect of anesthesia 10/03/2016    \"TEND TO TAKE A LITTLE LONGER FOR EFFECT TO WEAR OFF\"    Adverse effect of anesthesia     NO KNOWN HISTORY OF ANESTHESIA PROBLEMS IN FAMILY    Arthritis     Bursitis of left hip     Chronic pain     HIP, KNEES    Common migraine 05/11/2010    PT DENIES THIS DIAGNOSIS ON 10/3/16    CRP elevated 5/11/2010    Diverticulosis of sigmoid 05/2010    HAS HAD 2-3 EPISODES OF DIVERTICULITIS, PT STATES ON 10/3/16    Eczema 5/11/2010    Elevated cholesterol 5/11/2010    elevated particle number    Family history of early CAD 5/11/2010    Fe deficiency anemia 5/11/2010    H/O mamm[facility] 7/12/2012    [facility]    HTN (hypertension) 5/11/2010    Leukopenia     Vitamin D deficiency 8/6/2010     Past Surgical History:   Procedure Laterality Date    ENDOSCOPY, COLON, DIAGNOSTIC  2/25/11    HX ACL RECONSTRUCTION Right 10/2016    HX CHOLECYSTECTOMY  11/01    laparoscopic    HX HERNIA REPAIR  5823    UMBILICAL    HX HYSTERECTOMY  8/98    ovaries spared    HX ORTHOPAEDIC Right 2003    BUNIONECTOMY    HX ORTHOPAEDIC Left 02/2016    TOTAL HIP     Allergies   Allergen Reactions    Latex Itching     ITCHING, SWELLING     Current Outpatient Medications:     lisinopril-hydroCHLOROthiazide (PRINZIDE, ZESTORETIC) 20-12.5 mg per tablet, TAKE 1 TAB BY MOUTH DAILY. , Disp: 90 Tab, Rfl: 0    atorvastatin (LIPITOR) 40 mg tablet, TAKE 1 TABLET BY MOUTH EVERY DAY, Disp: 90 Tab, Rfl: 0    fexofenadine (ALLEGRA) 180 mg tablet, Take 1 Tab by mouth daily. , Disp: 30 Tab, Rfl: 0    cholecalciferol (VITAMIN D3) 1,000 unit cap, Take  by mouth daily. , Disp: , Rfl:     MULTI-VITAMIN PO, Take 1 Tab by mouth daily. ONE A DAY VITAMIN, Disp: , Rfl:     predniSONE (STERAPRED) 5 mg dose pack, See administration instruction per 5mg dose pack, Disp: 21 Tab, Rfl: 0  Review of Systems   Constitutional: Negative. Respiratory: Negative. Cardiovascular: Negative. Gastrointestinal: Negative. Blood pressure 110/80, pulse 77, temperature 98 °F (36.7 °C), temperature source Oral, resp. rate 18, height 5' 1\" (1.549 m), weight 228 lb 6.4 oz (103.6 kg), SpO2 98 %. Body mass index is 43.16 kg/m². Physical Exam  Constitutional:       Appearance: She is obese. HENT:      Mouth/Throat:      Mouth: Mucous membranes are moist.      Pharynx: Oropharynx is clear. Neck:      Musculoskeletal: Normal range of motion and neck supple. Cardiovascular:      Rate and Rhythm: Normal rate and regular rhythm. Pulses: Normal pulses. Heart sounds: Normal heart sounds. No murmur. Pulmonary:      Effort: Pulmonary effort is normal.      Breath sounds: Normal breath sounds. Abdominal:      General: Bowel sounds are normal.      Palpations: Abdomen is soft. Neurological:      Mental Status: She is alert. ASSESSMENT and PLAN    ICD-10-CM ICD-9-CM    1. HTN (hypertension), benign I10 401.1 CBC W/O DIFF   2. Elevated cholesterol E78.00 272.0 LIPID PANEL      METABOLIC PANEL, COMPREHENSIVE   3. Glucose intolerance (impaired glucose tolerance) R73.02 790.22 HEMOGLOBIN A1C WITH EAG   4.  Obesity, morbid ([facility] [address] E66.01 278.01      Follow up in six months for general medical exam  Pt was given an after visit summary which includes diagnosis, current medicines and vital and voiced understanding of treatment plan

## 2020-02-17 NOTE — PROGRESS NOTES
Chief Complaint   Patient presents with    Hypertension     Fasting     1. Have you been to the ER, urgent care clinic since your last visit? Hospitalized since your last visit? No    2. Have you seen or consulted any other health care providers outside of the [address] [place] [address] since your last visit? Include any pap smears or colon screening.  No

## 2020-02-18 ENCOUNTER — TELEPHONE (OUTPATIENT)
Dept: FAMILY MEDICINE CLINIC | Age: 60
End: 2020-02-18

## 2020-02-18 LAB
ALBUMIN SERPL-MCNC: 4.1 G/DL (ref 3.8–4.9)
ALBUMIN/GLOB SERPL: 1.4 {RATIO} (ref 1.2–2.2)
ALP SERPL-CCNC: 85 IU/L (ref 39–117)
ALT SERPL-CCNC: 32 IU/L (ref 0–32)
AST SERPL-CCNC: 30 IU/L (ref 0–40)
BILIRUB SERPL-MCNC: 0.4 MG/DL (ref 0–1.2)
BUN SERPL-MCNC: 12 MG/DL (ref 8–27)
BUN/CREAT SERPL: 19 (ref 12–28)
CALCIUM SERPL-MCNC: 9 MG/DL (ref 8.7–10.3)
CHLORIDE SERPL-SCNC: 105 MMOL/L (ref 96–106)
CHOLEST SERPL-MCNC: 142 MG/DL (ref 100–199)
CO2 SERPL-SCNC: 24 MMOL/L (ref 20–29)
CREAT SERPL-MCNC: 0.63 MG/DL (ref 0.57–1)
ERYTHROCYTE [DISTWIDTH] IN BLOOD BY AUTOMATED COUNT: 13.2 % (ref 11.7–15.4)
EST. AVERAGE GLUCOSE BLD GHB EST-MCNC: 140 MG/DL
GLOBULIN SER CALC-MCNC: 2.9 G/DL (ref 1.5–4.5)
GLUCOSE SERPL-MCNC: 119 MG/DL (ref 65–99)
HBA1C MFR BLD: 6.5 % (ref 4.8–5.6)
HCT VFR BLD AUTO: 37 % (ref 34–46.6)
HDLC SERPL-MCNC: 51 MG/DL
HGB BLD-MCNC: 12.4 G/DL (ref 11.1–15.9)
INTERPRETATION, 910389: NORMAL
LDLC SERPL CALC-MCNC: 66 MG/DL (ref 0–99)
MCH RBC QN AUTO: 29.2 PG (ref 26.6–33)
MCHC RBC AUTO-ENTMCNC: 33.5 G/DL (ref 31.5–35.7)
MCV RBC AUTO: 87 FL (ref 79–97)
PLATELET # BLD AUTO: 217 X10E3/UL (ref 150–450)
POTASSIUM SERPL-SCNC: 3.7 MMOL/L (ref 3.5–5.2)
PROT SERPL-MCNC: 7 G/DL (ref 6–8.5)
RBC # BLD AUTO: 4.25 X10E6/UL (ref 3.77–5.28)
SODIUM SERPL-SCNC: 142 MMOL/L (ref 134–144)
TRIGL SERPL-MCNC: 123 MG/DL (ref 0–149)
VLDLC SERPL CALC-MCNC: 25 MG/DL (ref 5–40)
WBC # BLD AUTO: 3.4 X10E3/UL (ref 3.4–10.8)

## 2020-02-18 RX ORDER — METFORMIN HYDROCHLORIDE 500 MG/1
500 TABLET, EXTENDED RELEASE ORAL
Qty: 30 TAB | Refills: 5 | Status: SHIPPED | OUTPATIENT
Start: 2020-02-18 | End: 2020-04-20 | Stop reason: SDUPTHER

## 2020-02-18 NOTE — TELEPHONE ENCOUNTER
Chief Complaint   Patient presents with    Labs     Patient was left an voice message to contact our office to discuss lab results and medication prescribed by [place] NP.   Letter sent of lab results

## 2020-02-18 NOTE — TELEPHONE ENCOUNTER
----- Message from Eryn Ann sent at 2/18/2020 10:37 AM EST -----  Regarding: YOAN Keller/ Telephone  Contact: [phone]  Caller's first and last name and relationship (if not the patient): n/a  Best contact number(s): [phone]  Whose call is being returned: Pt returning call to [facility] the nurse for lab results.   Details to clarify the request: n/a

## 2020-02-18 NOTE — TELEPHONE ENCOUNTER
Patient was returned a call, name and  verified. Patient given results as reviewed by Emma Jaquez NP . Informed Hemoglobin A1C is elevated would like to start on Metformin  mg tablet, take one tablet with dinner daily, script sent to pharmacy. Informed that if she has any problems with the medication to let our office know. Patient verbalized understanding.   Aundrea Link LPN

## 2020-03-15 DIAGNOSIS — I10 HTN (HYPERTENSION), BENIGN: ICD-10-CM

## 2020-03-16 RX ORDER — LISINOPRIL AND HYDROCHLOROTHIAZIDE 12.5; 2 MG/1; MG/1
1 TABLET ORAL DAILY
Qty: 90 TAB | Refills: 0 | Status: SHIPPED | OUTPATIENT
Start: 2020-03-16 | End: 2020-06-09

## 2020-04-20 NOTE — TELEPHONE ENCOUNTER
Request to change to 90 d/s. Last Visit: 2/17/20 YOAN Keller  Next Appointment: 8/14/20  YOAN Keller  Previous Refill Encounter(s): 2/18/20  #30 + 5 refills      Requested Prescriptions     Pending Prescriptions Disp Refills    metFORMIN ER (GLUCOPHAGE XR) 500 mg tablet 90 Tab 1     Sig: Take 1 Tab by mouth daily (with dinner).

## 2020-04-21 RX ORDER — METFORMIN HYDROCHLORIDE 500 MG/1
500 TABLET, EXTENDED RELEASE ORAL
Qty: 90 TAB | Refills: 1 | Status: SHIPPED | OUTPATIENT
Start: 2020-04-21 | End: 2021-02-05 | Stop reason: SDUPTHER

## 2020-06-07 DIAGNOSIS — I10 HTN (HYPERTENSION), BENIGN: ICD-10-CM

## 2020-06-09 RX ORDER — LISINOPRIL AND HYDROCHLOROTHIAZIDE 12.5; 2 MG/1; MG/1
TABLET ORAL
Qty: 90 TAB | Refills: 0 | Status: SHIPPED | OUTPATIENT
Start: 2020-06-09 | End: 2020-07-28

## 2020-07-24 DIAGNOSIS — I10 HTN (HYPERTENSION), BENIGN: ICD-10-CM

## 2020-07-27 DIAGNOSIS — E78.00 ELEVATED CHOLESTEROL: ICD-10-CM

## 2020-07-27 NOTE — TELEPHONE ENCOUNTER
Last Visit: 2/17/20  YOAN Keller  Next Appointment: 8/17/20  YOAN Keller  Previous Refill Encounter(s): 1/15/20  #90    Requested Prescriptions     Pending Prescriptions Disp Refills    atorvastatin (LIPITOR) 40 mg tablet 90 Tab 1     Sig: Take 1 Tab by mouth daily.

## 2020-07-28 RX ORDER — ATORVASTATIN CALCIUM 40 MG/1
40 TABLET, FILM COATED ORAL DAILY
Qty: 90 TAB | Refills: 1 | Status: SHIPPED | OUTPATIENT
Start: 2020-07-28 | End: 2021-02-05 | Stop reason: SDUPTHER

## 2020-07-28 RX ORDER — LISINOPRIL AND HYDROCHLOROTHIAZIDE 12.5; 2 MG/1; MG/1
TABLET ORAL
Qty: 90 TAB | Refills: 0 | Status: SHIPPED | OUTPATIENT
Start: 2020-07-28 | End: 2021-02-05 | Stop reason: SDUPTHER

## 2020-08-14 ENCOUNTER — TELEPHONE (OUTPATIENT)
Dept: FAMILY MEDICINE CLINIC | Age: 60
End: 2020-08-14

## 2020-08-14 NOTE — TELEPHONE ENCOUNTER
---- Message from [employer]- [employer] sent at 8/11/2020  9:18 PM EDT -----  Regarding: YOAN Keller/Telephone  Contact: [phone]  Caller's first and last name and relationship to patient (if not the patient): self  Best contact number: [phone]  Preferred date and time: August 26th @ 2pm, pt is off work this day and this CPE time slot works for her if still available. Scheduled appointment date and time: n/a  Reason for appointment: 8/12/20 appt cancelled, pt has to work.   Details to clarify the request: n/a

## 2020-09-08 NOTE — TELEPHONE ENCOUNTER
Patient was left an voice message stating to contact our office to schedule her appointment. Informed currently have openings for 9/16/2020 at 8:15 am, 8:45 am, 10:00 am and 10:15 am.  Informed to contact our office and schedule accordingly.   Rene Rodrigez LPN

## 2020-10-26 ENCOUNTER — OFFICE VISIT (OUTPATIENT)
Dept: URGENT CARE | Age: 60
End: 2020-10-26
Payer: COMMERCIAL

## 2020-10-26 VITALS — RESPIRATION RATE: 16 BRPM | HEART RATE: 74 BPM | TEMPERATURE: 97.7 F | OXYGEN SATURATION: 99 %

## 2020-10-26 DIAGNOSIS — Z20.822 EXPOSURE TO COVID-19 VIRUS: Primary | ICD-10-CM

## 2020-10-26 DIAGNOSIS — Z20.822 SUSPECTED COVID-19 VIRUS INFECTION: ICD-10-CM

## 2020-10-26 PROCEDURE — 99213 OFFICE O/P EST LOW 20 MIN: CPT | Performed by: NURSE PRACTITIONER

## 2020-10-26 NOTE — PROGRESS NOTES
Subjective: (As above and below)       This patient was seen in Flu Clinic at [address] [place] [address] Urgent Care while outdoors at their vehicle due to COVID-19 pandemic with PPE and focused examination in order to decrease community viral transmission. The patient/guardian gave verbal consent to treat. Chief Complaint   Patient presents with    Concern For COVID-19 (Coronavirus)     Pt c/o sore throat, body aches and cough since Saturday [address] is a 61 y.o. female who presents for evaluation of : possible covid symptoms after a known exposure: cough, body aches, runny nose, sore throat. Symptom onset 3 days ago Preceding illness: none. Has tried home therapies. No other identified aggravating or alleviating factors. Symptoms are constant and overall unchanged. Promotes no decrease in PO intake of fluids. Denies: severe lethargy, SOB, syncope/near syncope, vomiting/diarrhea, chest pain, chest pain with breathing, labored breathing, severe headache, non-intractable fevers . Recent travel: no  Known Exposure to COVID-19: YES  Known flu or strep contact: no           Review of Systems - negative except as listed above    Reviewed PmHx, RxHx, FmHx, SocHx, AllgHx and updated in chart.   Family History   Problem Relation Age of Onset    Colon Cancer Paternal Grandmother     Cancer Paternal Grandmother     Colon Cancer Paternal Grandfather     Arthritis-osteo Mother     Diabetes Mother     Elevated Lipids Mother     Hypertension Mother     Diabetes Father     Arthritis-osteo Father     Anesth Problems Neg Hx        Past Medical History:   Diagnosis Date    Adrenal nodule ([facility] [address] 12/07/2014    NO TREATMENT NEEDED    Adverse effect of anesthesia 10/03/2016    \"TEND TO TAKE A LITTLE LONGER FOR EFFECT TO WEAR OFF\"    Adverse effect of anesthesia     NO KNOWN HISTORY OF ANESTHESIA PROBLEMS IN FAMILY    Arthritis     Bursitis of left hip     Chronic pain     HIP, KNEES    Common migraine 05/11/2010    PT DENIES THIS DIAGNOSIS ON 10/3/16    CRP elevated 5/11/2010    Diverticulosis of sigmoid 05/2010    HAS HAD 2-3 EPISODES OF DIVERTICULITIS, PT STATES ON 10/3/16    Eczema 5/11/2010    Elevated cholesterol 5/11/2010    elevated particle number    Family history of early CAD 5/11/2010    Fe deficiency anemia 5/11/2010    [facility]12[facility]    HTN (hypertension) 5/11/2010    Leukopenia     Vitamin D deficiency 8/6/2010      Social History     Socioeconomic History    Marital status:      Spouse name: Not on file    Number of children: 2    Years of education: Not on file    Highest education level: Not on file   Occupational History    Occupation: Clerical   Tobacco Use    Smoking status: Never Smoker    Smokeless tobacco: Never Used   Substance and Sexual Activity    Alcohol use: Yes     Comment: rare    Drug use: No    Sexual activity: Yes     Partners: Male     Birth control/protection: None          Current Outpatient Medications   Medication Sig    lisinopril-hydroCHLOROthiazide (PRINZIDE, ZESTORETIC) 20-12.5 mg per tablet TAKE 1 TABLET BY MOUTH EVERY DAY    atorvastatin (LIPITOR) 40 mg tablet Take 1 Tab by mouth daily.  metFORMIN ER (GLUCOPHAGE XR) 500 mg tablet Take 1 Tab by mouth daily (with dinner).  cholecalciferol (VITAMIN D3) 1,000 unit cap Take  by mouth daily.  MULTI-VITAMIN PO Take 1 Tab by mouth daily. ONE A DAY VITAMIN     No current facility-administered medications for this visit. Objective:     Vitals:    10/26/20 1111   Pulse: 74   Resp: 16   Temp: 97.7 °F (36.5 °C)   SpO2: 99%       Physical Exam  General appearance  appears well hydrated and does not appear toxic, no acute distress  Eyes - EOMs intact. Non injected. No scleral icterus   Ears - no external swelling  Nose - nasal congestion present. No purulent drainage  Mouth - OP mild erythema without swelling, exudate or lesion.  Mucus membranes moist. Uvula midline. Neck/Lymphatics  trachea midline, full AROM  Chest - normal breathing effort no wheeze rales or rhonchi bilat  Heart - RRR no murmurs  Skin - no observable rashes or pallor  Neurologic- alert and oriented x 3  Psychiatric- normal mood, behavior and though content. Assessment/ Plan:     1. Exposure to COVID-19 virus  Plan:  Differential diagnosis includes: viral URI, COVID-19  No evidence of complication of illness at this time. Supportive home care- maintain adequate fluid intake, lozenges, over the counter Tylenol. Rx-   Patient is aware that the differential includes COVID-19 and was advised the following: self isolation based on current CDC guidelines, avoiding high risk individuals, washing hands frequently, avoid touching face, eyes or mucus membranes, wearing surgical mask if they are in public to reduce transmission to others.  - NOVEL CORONAVIRUS (COVID-19)    2. Suspected COVID-19 virus infection    - NOVEL CORONAVIRUS (COVID-19)      Follow up: We have reviewed, in detail, particular presentations/worsening/concerning signs and symptoms that may warrant seeking immediate care in the ED setting and patient verbalized being aware of what to watch for. For other non-severe changes, non-improvement or questions, patient aware to contact PCP office or consider a virtual online medical consultation. Reviewed with her that COVID-19 pandemic is an evolving situation with rapidly changing recommendations & guidelines. Medical decisions are made based on the the best information available at the time.   Recommended she stay tuned for updates published by trusted sources and to advise your PCP of any unexpected changes in clinical condition     Rajendra Ramsay NP

## 2020-10-26 NOTE — LETTER
NOTIFICATION RETURN TO WORK / SCHOOL 
 
10/26/2020 11:45 AM 
 
Ms. Naty Velazquez [address] [place] [id number] To Whom It May Concern: 
 
Naty Velazquez is currently under the care of [address]. Please allow to quarantine until 11/04/2020 If there are questions or concerns please have the patient contact our office. Sincerely, GHE PROVIDER

## 2020-10-28 LAB — SARS-COV-2, NAA: NOT DETECTED

## 2021-02-05 ENCOUNTER — OFFICE VISIT (OUTPATIENT)
Dept: FAMILY MEDICINE CLINIC | Age: 61
End: 2021-02-05
Payer: COMMERCIAL

## 2021-02-05 VITALS
OXYGEN SATURATION: 98 % | HEIGHT: 61 IN | WEIGHT: 232.8 LBS | RESPIRATION RATE: 18 BRPM | BODY MASS INDEX: 43.95 KG/M2 | SYSTOLIC BLOOD PRESSURE: 130 MMHG | HEART RATE: 70 BPM | DIASTOLIC BLOOD PRESSURE: 87 MMHG | TEMPERATURE: 98.1 F

## 2021-02-05 DIAGNOSIS — I10 HTN (HYPERTENSION), BENIGN: Primary | ICD-10-CM

## 2021-02-05 DIAGNOSIS — R73.02 GLUCOSE INTOLERANCE (IMPAIRED GLUCOSE TOLERANCE): ICD-10-CM

## 2021-02-05 DIAGNOSIS — E66.01 OBESITY, MORBID (HCC): ICD-10-CM

## 2021-02-05 DIAGNOSIS — E78.00 ELEVATED CHOLESTEROL: ICD-10-CM

## 2021-02-05 LAB
ALBUMIN SERPL-MCNC: 3.7 G/DL (ref 3.5–5)
ALBUMIN/GLOB SERPL: 1 {RATIO} (ref 1.1–2.2)
ALP SERPL-CCNC: 97 U/L (ref 45–117)
ALT SERPL-CCNC: 35 U/L (ref 12–78)
ANION GAP SERPL CALC-SCNC: 5 MMOL/L (ref 5–15)
AST SERPL-CCNC: 21 U/L (ref 15–37)
BILIRUB SERPL-MCNC: 0.3 MG/DL (ref 0.2–1)
BUN SERPL-MCNC: 14 MG/DL (ref 6–20)
BUN/CREAT SERPL: 21 (ref 12–20)
CALCIUM SERPL-MCNC: 8.9 MG/DL (ref 8.5–10.1)
CHLORIDE SERPL-SCNC: 108 MMOL/L (ref 97–108)
CHOLEST SERPL-MCNC: 143 MG/DL
CO2 SERPL-SCNC: 28 MMOL/L (ref 21–32)
CREAT SERPL-MCNC: 0.66 MG/DL (ref 0.55–1.02)
ERYTHROCYTE [DISTWIDTH] IN BLOOD BY AUTOMATED COUNT: 13.9 % (ref 11.5–14.5)
EST. AVERAGE GLUCOSE BLD GHB EST-MCNC: 131 MG/DL
GLOBULIN SER CALC-MCNC: 3.7 G/DL (ref 2–4)
GLUCOSE SERPL-MCNC: 115 MG/DL (ref 65–100)
HBA1C MFR BLD: 6.2 % (ref 4–5.6)
HCT VFR BLD AUTO: 37.9 % (ref 35–47)
HDLC SERPL-MCNC: 61 MG/DL
HDLC SERPL: 2.3 {RATIO} (ref 0–5)
HGB BLD-MCNC: 12.2 G/DL (ref 11.5–16)
LDLC SERPL CALC-MCNC: 62.2 MG/DL (ref 0–100)
LIPID PROFILE,FLP: NORMAL
MCH RBC QN AUTO: 29.3 PG (ref 26–34)
MCHC RBC AUTO-ENTMCNC: 32.2 G/DL (ref 30–36.5)
MCV RBC AUTO: 91.1 FL (ref 80–99)
NRBC # BLD: 0 K/UL (ref 0–0.01)
NRBC BLD-RTO: 0 PER 100 WBC
PLATELET # BLD AUTO: 197 K/UL (ref 150–400)
PMV BLD AUTO: 10.1 FL (ref 8.9–12.9)
POTASSIUM SERPL-SCNC: 3.9 MMOL/L (ref 3.5–5.1)
PROT SERPL-MCNC: 7.4 G/DL (ref 6.4–8.2)
RBC # BLD AUTO: 4.16 M/UL (ref 3.8–5.2)
SODIUM SERPL-SCNC: 141 MMOL/L (ref 136–145)
TRIGL SERPL-MCNC: 99 MG/DL (ref ?–150)
VLDLC SERPL CALC-MCNC: 19.8 MG/DL
WBC # BLD AUTO: 3.2 K/UL (ref 3.6–11)

## 2021-02-05 PROCEDURE — 99204 OFFICE O/P NEW MOD 45 MIN: CPT | Performed by: NURSE PRACTITIONER

## 2021-02-05 RX ORDER — ATORVASTATIN CALCIUM 40 MG/1
40 TABLET, FILM COATED ORAL DAILY
Qty: 90 TAB | Refills: 1 | Status: SHIPPED | OUTPATIENT
Start: 2021-02-05 | End: 2021-08-08

## 2021-02-05 RX ORDER — LISINOPRIL AND HYDROCHLOROTHIAZIDE 12.5; 2 MG/1; MG/1
1 TABLET ORAL DAILY
Qty: 90 TAB | Refills: 1 | Status: SHIPPED | OUTPATIENT
Start: 2021-02-05 | End: 2021-08-08

## 2021-02-05 RX ORDER — METFORMIN HYDROCHLORIDE 500 MG/1
500 TABLET, EXTENDED RELEASE ORAL
Qty: 90 TAB | Refills: 1 | Status: SHIPPED | OUTPATIENT
Start: 2021-02-05

## 2021-02-05 NOTE — PROGRESS NOTES
[facility]  Clinic Note  Subjective:      Loreto Coelho is a 61 y.o. female who presents for chronic problems, patient has history of hypertension, hyperlipidemia diabetes and morbid obesity.  Her mammogram was done in [facility] in October, will bring a copy.    Past Medical History:   Diagnosis Date   • Adrenal nodule ([facility]) 12/07/2014    NO TREATMENT NEEDED   • Adverse effect of anesthesia 10/03/2016    \"TEND TO TAKE A LITTLE LONGER FOR EFFECT TO WEAR OFF\"   • Adverse effect of anesthesia     NO KNOWN HISTORY OF ANESTHESIA PROBLEMS IN FAMILY   • Arthritis    • Bursitis of left hip    • Chronic pain     HIP, KNEES   • Common migraine 05/11/2010    PT DENIES THIS DIAGNOSIS ON 10/3/16   • CRP elevated 5/11/2010   • Diverticulosis of sigmoid 05/2010    HAS HAD 2-3 EPISODES OF DIVERTICULITIS, PT STATES ON 10/3/16   • Eczema 5/11/2010   • Elevated cholesterol 5/11/2010    elevated particle number   • Family history of early CAD 5/11/2010   • Fe deficiency anemia 5/11/2010   • [facility]   • HTN (hypertension) 5/11/2010   • Leukopenia    • Vitamin D deficiency 8/6/2010        Past Surgical History:   Procedure Laterality Date   • ENDOSCOPY, COLON, DIAGNOSTIC  2/25/11   • HX ACL RECONSTRUCTION Right 10/2016   • HX CHOLECYSTECTOMY  11/01    laparoscopic   • HX HERNIA REPAIR  1989    UMBILICAL   • HX HYSTERECTOMY  8/98    ovaries spared   • HX ORTHOPAEDIC Right 2003    BUNIONECTOMY   • HX ORTHOPAEDIC Left 02/2016    TOTAL HIP       Current Outpatient Medications   Medication Sig Dispense Refill   • lisinopril-hydroCHLOROthiazide (PRINZIDE, ZESTORETIC) 20-12.5 mg per tablet Take 1 Tab by mouth daily. 90 Tab 1   • atorvastatin (LIPITOR) 40 mg tablet Take 1 Tab by mouth daily. 90 Tab 1   • metFORMIN ER (GLUCOPHAGE XR) 500 mg tablet Take 1 Tab by mouth daily (with dinner). 90 Tab 1   • cholecalciferol (VITAMIN D3) 1,000 unit cap Take  by mouth daily.     MULTI-VITAMIN PO Take 1 Tab by mouth daily. ONE A DAY VITAMIN       Allergies   Allergen Reactions    Latex Itching     ITCHING, SWELLING       ROS:   Complete review of systems was reviewed with pertinent information listed in HPI. Review of Systems   Constitutional: Negative. HENT: Negative. Respiratory: Negative. Cardiovascular: Negative. Gastrointestinal: Negative. Objective:     Visit Vitals  /87   Pulse 70   Temp 98.1 °F (36.7 °C) (Temporal)   Resp 18   Ht 5' 1\" (1.549 m)   Wt 232 lb 12.8 oz (105.6 kg)   SpO2 98%   BMI 43.99 kg/m²       Vitals and Nurse Documentation reviewed. Physical Exam  Constitutional:       Appearance: Normal appearance. She is obese. HENT:      Mouth/Throat:      Mouth: Mucous membranes are moist.   Neck:      Musculoskeletal: Normal range of motion and neck supple. Cardiovascular:      Pulses: Normal pulses. Heart sounds: Normal heart sounds. No murmur. Pulmonary:      Effort: Pulmonary effort is normal.      Breath sounds: Normal breath sounds. Abdominal:      General: Bowel sounds are normal.      Palpations: Abdomen is soft. Neurological:      Mental Status: She is alert. Psychiatric:         Mood and Affect: Mood normal.         Thought Content: Thought content normal.         Assessment/Plan:     Diagnoses and all orders for this visit:    1. HTN (hypertension), benign  -     METABOLIC PANEL, COMPREHENSIVE; Future  -     CBC W/O DIFF; Future  -     lisinopril-hydroCHLOROthiazide (PRINZIDE, ZESTORETIC) 20-12.5 mg per tablet; Take 1 Tab by mouth daily. 2. Elevated cholesterol  -     LIPID PANEL; Future  -     atorvastatin (LIPITOR) 40 mg tablet; Take 1 Tab by mouth daily. 3. Glucose intolerance (impaired glucose tolerance)  -     HEMOGLOBIN A1C WITH EAG; Future  -     metFORMIN ER (GLUCOPHAGE XR) 500 mg tablet; Take 1 Tab by mouth daily (with dinner).     4. Obesity, morbid ([address]    follow up in six months for general medical exam      Pt expressed understanding with the diagnosis and plan        Discussed expected course/resolution/complications of diagnosis in detail with patient.    Medication risks/benefits/costs/interactions/alternatives discussed with patient.    Pt was given an after visit summary which includes diagnoses, current medications & vitals.  Pt expressed understanding with the diagnosis and plan

## 2021-08-07 DIAGNOSIS — E78.00 ELEVATED CHOLESTEROL: ICD-10-CM

## 2021-08-07 DIAGNOSIS — I10 HTN (HYPERTENSION), BENIGN: ICD-10-CM

## 2021-08-08 RX ORDER — ATORVASTATIN CALCIUM 40 MG/1
TABLET, FILM COATED ORAL
Qty: 30 TABLET | Refills: 0 | Status: SHIPPED | OUTPATIENT
Start: 2021-08-08 | End: 2021-10-12 | Stop reason: SDUPTHER

## 2021-08-08 RX ORDER — LISINOPRIL AND HYDROCHLOROTHIAZIDE 12.5; 2 MG/1; MG/1
TABLET ORAL
Qty: 30 TABLET | Refills: 0 | Status: SHIPPED | OUTPATIENT
Start: 2021-08-08 | End: 2021-10-12 | Stop reason: SDUPTHER

## 2021-10-12 ENCOUNTER — OFFICE VISIT (OUTPATIENT)
Dept: FAMILY MEDICINE CLINIC | Age: 61
End: 2021-10-12
Payer: COMMERCIAL

## 2021-10-12 VITALS
SYSTOLIC BLOOD PRESSURE: 132 MMHG | TEMPERATURE: 97.5 F | HEIGHT: 61 IN | OXYGEN SATURATION: 98 % | DIASTOLIC BLOOD PRESSURE: 84 MMHG | HEART RATE: 75 BPM | BODY MASS INDEX: 43.05 KG/M2 | RESPIRATION RATE: 18 BRPM | WEIGHT: 228 LBS

## 2021-10-12 DIAGNOSIS — M25.50 ARTHRALGIA, UNSPECIFIED JOINT: ICD-10-CM

## 2021-10-12 DIAGNOSIS — Z00.00 GENERAL MEDICAL EXAMINATION: Primary | ICD-10-CM

## 2021-10-12 DIAGNOSIS — I10 HTN (HYPERTENSION), BENIGN: ICD-10-CM

## 2021-10-12 DIAGNOSIS — E78.00 ELEVATED CHOLESTEROL: ICD-10-CM

## 2021-10-12 DIAGNOSIS — Z12.11 ENCOUNTER FOR SCREENING COLONOSCOPY: ICD-10-CM

## 2021-10-12 DIAGNOSIS — R73.02 GLUCOSE INTOLERANCE (IMPAIRED GLUCOSE TOLERANCE): ICD-10-CM

## 2021-10-12 DIAGNOSIS — E66.01 OBESITY, MORBID (HCC): ICD-10-CM

## 2021-10-12 DIAGNOSIS — Z23 NEEDS FLU SHOT: ICD-10-CM

## 2021-10-12 DIAGNOSIS — E55.9 VITAMIN D DEFICIENCY: ICD-10-CM

## 2021-10-12 LAB
25(OH)D3 SERPL-MCNC: 22.5 NG/ML (ref 30–100)
ALBUMIN SERPL-MCNC: 3.4 G/DL (ref 3.5–5)
ALBUMIN/GLOB SERPL: 0.9 {RATIO} (ref 1.1–2.2)
ALP SERPL-CCNC: 92 U/L (ref 45–117)
ALT SERPL-CCNC: 27 U/L (ref 12–78)
ANION GAP SERPL CALC-SCNC: 5 MMOL/L (ref 5–15)
AST SERPL-CCNC: 20 U/L (ref 15–37)
BILIRUB SERPL-MCNC: 0.5 MG/DL (ref 0.2–1)
BUN SERPL-MCNC: 9 MG/DL (ref 6–20)
BUN/CREAT SERPL: 14 (ref 12–20)
CALCIUM SERPL-MCNC: 8.9 MG/DL (ref 8.5–10.1)
CHLORIDE SERPL-SCNC: 108 MMOL/L (ref 97–108)
CHOLEST SERPL-MCNC: 150 MG/DL
CO2 SERPL-SCNC: 28 MMOL/L (ref 21–32)
CREAT SERPL-MCNC: 0.63 MG/DL (ref 0.55–1.02)
ERYTHROCYTE [DISTWIDTH] IN BLOOD BY AUTOMATED COUNT: 13.4 % (ref 11.5–14.5)
ERYTHROCYTE [SEDIMENTATION RATE] IN BLOOD: 31 MM/HR (ref 0–30)
EST. AVERAGE GLUCOSE BLD GHB EST-MCNC: 137 MG/DL
GLOBULIN SER CALC-MCNC: 3.7 G/DL (ref 2–4)
GLUCOSE SERPL-MCNC: 124 MG/DL (ref 65–100)
HBA1C MFR BLD: 6.4 % (ref 4–5.6)
HCT VFR BLD AUTO: 37.7 % (ref 35–47)
HDLC SERPL-MCNC: 61 MG/DL
HDLC SERPL: 2.5 {RATIO} (ref 0–5)
HGB BLD-MCNC: 12 G/DL (ref 11.5–16)
LDLC SERPL CALC-MCNC: 68.2 MG/DL (ref 0–100)
MCH RBC QN AUTO: 29.1 PG (ref 26–34)
MCHC RBC AUTO-ENTMCNC: 31.8 G/DL (ref 30–36.5)
MCV RBC AUTO: 91.3 FL (ref 80–99)
NRBC # BLD: 0 K/UL (ref 0–0.01)
NRBC BLD-RTO: 0 PER 100 WBC
PLATELET # BLD AUTO: 204 K/UL (ref 150–400)
PMV BLD AUTO: 10.7 FL (ref 8.9–12.9)
POTASSIUM SERPL-SCNC: 3.7 MMOL/L (ref 3.5–5.1)
PROT SERPL-MCNC: 7.1 G/DL (ref 6.4–8.2)
RBC # BLD AUTO: 4.13 M/UL (ref 3.8–5.2)
SODIUM SERPL-SCNC: 141 MMOL/L (ref 136–145)
T4 FREE SERPL-MCNC: 1 NG/DL (ref 0.8–1.5)
TRIGL SERPL-MCNC: 104 MG/DL (ref ?–150)
TSH SERPL DL<=0.05 MIU/L-ACNC: 1.56 UIU/ML (ref 0.36–3.74)
VLDLC SERPL CALC-MCNC: 20.8 MG/DL
WBC # BLD AUTO: 3.7 K/UL (ref 3.6–11)

## 2021-10-12 PROCEDURE — 90686 IIV4 VACC NO PRSV 0.5 ML IM: CPT | Performed by: NURSE PRACTITIONER

## 2021-10-12 PROCEDURE — 90471 IMMUNIZATION ADMIN: CPT | Performed by: NURSE PRACTITIONER

## 2021-10-12 PROCEDURE — 99396 PREV VISIT EST AGE 40-64: CPT | Performed by: NURSE PRACTITIONER

## 2021-10-12 RX ORDER — ATORVASTATIN CALCIUM 40 MG/1
40 TABLET, FILM COATED ORAL DAILY
Qty: 90 TABLET | Refills: 1 | Status: SHIPPED | OUTPATIENT
Start: 2021-10-12 | End: 2022-04-15

## 2021-10-12 RX ORDER — LISINOPRIL AND HYDROCHLOROTHIAZIDE 12.5; 2 MG/1; MG/1
1 TABLET ORAL DAILY
Qty: 90 TABLET | Refills: 1 | Status: SHIPPED | OUTPATIENT
Start: 2021-10-12 | End: 2022-04-13

## 2021-10-12 NOTE — PROGRESS NOTES
Chief Complaint   Patient presents with    Complete Physical     follow up       1. \"Have you been to the ER, urgent care clinic since your last visit? Hospitalized since your last visit? \" No    2. \"Have you seen or consulted any other health care providers outside of the [address] [place] [address] since your last visit? \" No     3. For patients over 45: Has the patient had a colonoscopy? No     If the patient is female:    4. For patients over 40: Has the patient had a mammogram? No    5. For patients over 21: Has the patient had a pap smear? No     3 most recent PHQ Screens 10/12/2021   Little interest or pleasure in doing things Not at all   Feeling down, depressed, irritable, or hopeless Not at all   Total Score PHQ 2 0         Abuse Screening Questionnaire 10/12/2021   Do you ever feel afraid of your partner? N   Are you in a relationship with someone who physically or mentally threatens you? N   Is it safe for you to go home?  Y          Health Maintenance Due   Topic Date Due    COVID-19 Vaccine (1) Never done    Shingrix Vaccine Age 50> (1 of 2) Never done    Breast Cancer Screen Mammogram  10/18/2018    Colorectal Cancer Screening Combo  02/25/2021    Flu Vaccine (1) 09/01/2021

## 2021-10-12 NOTE — PROGRESS NOTES
Subjective:   64 y.o. female for Well Woman Check. Her gyne and breast care is done elsewhere by her Ob-Gyne physician. Patient Active Problem List   Diagnosis Code    Eczema L30.9    Family history of early CAD Z80.55    Elevated cholesterol E78.00    CRP elevated R79.82    Common migraine G43.009    Diverticulosis of sigmoid     Leukopenia D72.819    Vitamin D deficiency E55.9    Primary osteoarthritis of left hip M16.12    Avascular necrosis of bone of left hip ([address] M87.052    History of total left hip replacement Z96.642    Primary osteoarthritis of right knee M17.11    Status post total right knee replacement Z96.651    Obesity, morbid (HCC) E66.01    HTN (hypertension), benign I10    Glucose intolerance (impaired glucose tolerance) R73.02     Patient Active Problem List    Diagnosis Date Noted    Obesity, morbid ([address] 11/29/2017    HTN (hypertension), benign 11/29/2017    Glucose intolerance (impaired glucose tolerance) 11/29/2017    Status post total right knee replacement 12/14/2016    Primary osteoarthritis of right knee 10/24/2016    History of total left hip replacement 07/21/2016    Avascular necrosis of bone of left hip ([address] 06/24/2016    Primary osteoarthritis of left hip 02/29/2016    Leukopenia     Vitamin D deficiency     Diverticulosis of sigmoid     Eczema 05/11/2010    Family history of early CAD 05/11/2010    Elevated cholesterol 05/11/2010    CRP elevated 05/11/2010    Common migraine 05/11/2010     Current Outpatient Medications   Medication Sig Dispense Refill    lisinopril-hydroCHLOROthiazide (PRINZIDE, ZESTORETIC) 20-12.5 mg per tablet Take 1 Tablet by mouth daily. 90 Tablet 1    atorvastatin (LIPITOR) 40 mg tablet Take 1 Tablet by mouth daily. 90 Tablet 1    metFORMIN ER (GLUCOPHAGE XR) 500 mg tablet Take 1 Tab by mouth daily (with dinner). 90 Tab 1    cholecalciferol (VITAMIN D3) 1,000 unit cap Take  by mouth daily.       MULTI-VITAMIN PO Take 1 Tab by mouth daily.  ONE A DAY VITAMIN       Allergies   Allergen Reactions    Latex Itching     ITCHING, SWELLING     Past Medical History:   Diagnosis Date    Adrenal nodule ([address] 12/07/2014    NO TREATMENT NEEDED    Adverse effect of anesthesia 10/03/2016    \"TEND TO TAKE A LITTLE LONGER FOR EFFECT TO WEAR OFF\"    Adverse effect of anesthesia     NO KNOWN HISTORY OF ANESTHESIA PROBLEMS IN FAMILY    Arthritis     Bursitis of left hip     Chronic pain     HIP, KNEES    Common migraine 05/11/2010    PT DENIES THIS DIAGNOSIS ON 10/3/16    CRP elevated 5/11/2010    Diverticulosis of sigmoid 05/2010    HAS HAD 2-3 EPISODES OF DIVERTICULITIS, PT STATES ON 10/3/16    Eczema 5/11/2010    Elevated cholesterol 5/11/2010    elevated particle number    Family history of early CAD 5/11/2010    Fe deficiency anemia 5/11/2010    H/O mammogram 7/12/[address]    [address]    HTN (hypertension) 5/11/2010    Leukopenia     Vitamin D deficiency 8/6/2010     Past Surgical History:   Procedure Laterality Date    ENDOSCOPY, COLON, DIAGNOSTIC  2/25/11    HX ACL RECONSTRUCTION Right 10/2016    HX CHOLECYSTECTOMY  11/01    laparoscopic    HX HERNIA REPAIR  1763    UMBILICAL    HX HYSTERECTOMY  8/98    ovaries spared    HX ORTHOPAEDIC Right 2003    BUNIONECTOMY    HX ORTHOPAEDIC Left 02/2016    TOTAL HIP     Family History   Problem Relation Age of Onset    Colon Cancer Paternal Grandmother     Cancer Paternal Grandmother     Colon Cancer Paternal Grandfather     Arthritis-osteo Mother     Diabetes Mother     Elevated Lipids Mother     Hypertension Mother     Diabetes Father     Arthritis-osteo Father     Anesth Problems Neg Hx      Social History     Tobacco Use    Smoking status: Never Smoker    Smokeless tobacco: Never Used   Substance Use Topics    Alcohol use: Yes     Comment: rare          Specific concerns today: Pap and mammogram done at GYN  Due for colonoscopy  Due for flu vaccine    Review of Systems  A comprehensive review of systems was negative except for that written in the HPI. Objective:   Blood pressure 132/84, pulse 75, temperature 97.5 °F (36.4 °C), temperature source Temporal, resp. rate 18, height 5' 1\" (1.549 m), weight 228 lb (103.4 kg), SpO2 98 %. Physical Examination:   General appearance - alert, well appearing, and in no distress  Mental status - alert, oriented to person, place, and time  Eyes - pupils equal and reactive, extraocular eye movements intact  Ears - bilateral TM's and external ear canals normal  Nose - normal and patent, no erythema, discharge or polyps  Mouth - mucous membranes moist, pharynx normal without lesions  Neck - supple, no significant adenopathy  Lymphatics - no palpable lymphadenopathy, no hepatosplenomegaly  Chest - clear to auscultation, no wheezes, rales or rhonchi, symmetric air entry  Heart - normal rate, regular rhythm, normal S1, S2, no murmurs, rubs, clicks or gallops  Abdomen - soft, nontender, nondistended, no masses or organomegaly  Back exam - full range of motion, no tenderness, palpable spasm or pain on motion  Neurological - alert, oriented, normal speech, no focal findings or movement disorder noted  Musculoskeletal - no joint tenderness, deformity or swelling  Extremities - peripheral pulses normal, no pedal edema, no clubbing or cyanosis  Skin - normal coloration and turgor, no rashes, no suspicious skin lesions noted     Assessment/Plan:     lose weight, increase physical activity, follow low fat diet, follow low salt diet, continue present plan, routine labs ordered    ICD-10-CM ICD-9-CM    1. General medical examination  Z00.00 V70.9 T4, FREE      TSH 3RD GENERATION      TSH 3RD GENERATION      T4, FREE   2.  HTN (hypertension), benign  U30 530.5 METABOLIC PANEL, COMPREHENSIVE      CBC W/O DIFF      lisinopril-hydroCHLOROthiazide (PRINZIDE, ZESTORETIC) 20-12.5 mg per tablet      CBC W/O DIFF      METABOLIC PANEL, COMPREHENSIVE   3. Elevated cholesterol  E78.00 272.0 LIPID PANEL      atorvastatin (LIPITOR) 40 mg tablet      LIPID PANEL   4. Glucose intolerance (impaired glucose tolerance)  R73.02 790.22 HEMOGLOBIN A1C WITH EAG      HEMOGLOBIN A1C WITH EAG   5. Obesity, morbid ([address]  E66.01 278.01    6. Vitamin D deficiency  E55.9 268.9 VITAMIN D, 25 HYDROXY      VITAMIN D, 25 HYDROXY   7. Encounter for screening colonoscopy  Z12.11 V76.51 REFERRAL TO GASTROENTEROLOGY   8. Arthralgia, unspecified joint  M25.50 719.40 SED RATE (ESR)      SED RATE (ESR)   9.  Needs flu shot  Z23 V04.81 INFLUENZA VIRUS VAC QUAD,SPLIT,PRESV FREE SYRINGE IM

## 2021-10-13 RX ORDER — GLUCOSAMINE SULFATE 1500 MG
1000 POWDER IN PACKET (EA) ORAL DAILY
Qty: 90 CAPSULE | Refills: 2 | Status: SHIPPED | OUTPATIENT
Start: 2021-10-13 | End: 2022-07-10

## 2021-10-16 ENCOUNTER — HOSPITAL ENCOUNTER (EMERGENCY)
Age: 61
Discharge: HOME | End: 2021-10-16
Attending: EMERGENCY MEDICINE
Payer: COMMERCIAL

## 2021-10-16 ENCOUNTER — APPOINTMENT (OUTPATIENT)
Dept: CT IMAGING | Age: 61
End: 2021-10-16
Attending: EMERGENCY MEDICINE
Payer: COMMERCIAL

## 2021-10-16 VITALS
RESPIRATION RATE: 16 BRPM | HEART RATE: 88 BPM | OXYGEN SATURATION: 100 % | TEMPERATURE: 98 F | HEIGHT: 62 IN | BODY MASS INDEX: 41.22 KG/M2 | WEIGHT: 224 LBS | DIASTOLIC BLOOD PRESSURE: 79 MMHG | SYSTOLIC BLOOD PRESSURE: 125 MMHG

## 2021-10-16 DIAGNOSIS — K57.32 SIGMOID DIVERTICULITIS: Primary | ICD-10-CM

## 2021-10-16 LAB
ALBUMIN SERPL-MCNC: 3.2 G/DL (ref 3.5–5)
ALBUMIN/GLOB SERPL: 0.7 {RATIO} (ref 1.1–2.2)
ALP SERPL-CCNC: 86 U/L (ref 45–117)
ALT SERPL-CCNC: 28 U/L (ref 12–78)
ANION GAP SERPL CALC-SCNC: 4 MMOL/L (ref 5–15)
APPEARANCE UR: CLEAR
AST SERPL-CCNC: 18 U/L (ref 15–37)
BACTERIA URNS QL MICRO: ABNORMAL /HPF
BASOPHILS # BLD: 0 K/UL (ref 0–0.1)
BASOPHILS NFR BLD: 0 % (ref 0–1)
BILIRUB SERPL-MCNC: 0.5 MG/DL (ref 0.2–1)
BILIRUB UR QL: NEGATIVE
BUN SERPL-MCNC: 7 MG/DL (ref 6–20)
BUN/CREAT SERPL: 11 (ref 12–20)
CALCIUM SERPL-MCNC: 8.6 MG/DL (ref 8.5–10.1)
CHLORIDE SERPL-SCNC: 109 MMOL/L (ref 97–108)
CO2 SERPL-SCNC: 25 MMOL/L (ref 21–32)
COLOR UR: ABNORMAL
COMMENT, HOLDF: NORMAL
CREAT SERPL-MCNC: 0.66 MG/DL (ref 0.55–1.02)
DIFFERENTIAL METHOD BLD: ABNORMAL
EOSINOPHIL # BLD: 0.2 K/UL (ref 0–0.4)
EOSINOPHIL NFR BLD: 2 % (ref 0–7)
EPITH CASTS URNS QL MICRO: ABNORMAL /LPF
ERYTHROCYTE [DISTWIDTH] IN BLOOD BY AUTOMATED COUNT: 13.2 % (ref 11.5–14.5)
GLOBULIN SER CALC-MCNC: 4.4 G/DL (ref 2–4)
GLUCOSE SERPL-MCNC: 120 MG/DL (ref 65–100)
GLUCOSE UR STRIP.AUTO-MCNC: NEGATIVE MG/DL
HCT VFR BLD AUTO: 38 % (ref 35–47)
HGB BLD-MCNC: 12.3 G/DL (ref 11.5–16)
HGB UR QL STRIP: NEGATIVE
HYALINE CASTS URNS QL MICRO: ABNORMAL /LPF (ref 0–5)
IMM GRANULOCYTES # BLD AUTO: 0 K/UL (ref 0–0.04)
IMM GRANULOCYTES NFR BLD AUTO: 0 % (ref 0–0.5)
KETONES UR QL STRIP.AUTO: NEGATIVE MG/DL
LEUKOCYTE ESTERASE UR QL STRIP.AUTO: ABNORMAL
LIPASE SERPL-CCNC: 64 U/L (ref 73–393)
LYMPHOCYTES # BLD: 0.9 K/UL (ref 0.8–3.5)
LYMPHOCYTES NFR BLD: 12 % (ref 12–49)
MCH RBC QN AUTO: 29 PG (ref 26–34)
MCHC RBC AUTO-ENTMCNC: 32.4 G/DL (ref 30–36.5)
MCV RBC AUTO: 89.6 FL (ref 80–99)
MONOCYTES # BLD: 0.6 K/UL (ref 0–1)
MONOCYTES NFR BLD: 8 % (ref 5–13)
NEUTS SEG # BLD: 5.9 K/UL (ref 1.8–8)
NEUTS SEG NFR BLD: 78 % (ref 32–75)
NITRITE UR QL STRIP.AUTO: NEGATIVE
NRBC # BLD: 0 K/UL (ref 0–0.01)
NRBC BLD-RTO: 0 PER 100 WBC
PH UR STRIP: 7 [PH] (ref 5–8)
PLATELET # BLD AUTO: 224 K/UL (ref 150–400)
PMV BLD AUTO: 9.4 FL (ref 8.9–12.9)
POTASSIUM SERPL-SCNC: 3.4 MMOL/L (ref 3.5–5.1)
PROT SERPL-MCNC: 7.6 G/DL (ref 6.4–8.2)
PROT UR STRIP-MCNC: NEGATIVE MG/DL
RBC # BLD AUTO: 4.24 M/UL (ref 3.8–5.2)
RBC #/AREA URNS HPF: ABNORMAL /HPF (ref 0–5)
SAMPLES BEING HELD,HOLD: NORMAL
SODIUM SERPL-SCNC: 138 MMOL/L (ref 136–145)
SP GR UR REFRACTOMETRY: 1.02 (ref 1–1.03)
UR CULT HOLD, URHOLD: NORMAL
UROBILINOGEN UR QL STRIP.AUTO: 2 EU/DL (ref 0.2–1)
WBC # BLD AUTO: 7.7 K/UL (ref 3.6–11)
WBC URNS QL MICRO: ABNORMAL /HPF (ref 0–4)

## 2021-10-16 PROCEDURE — 83690 ASSAY OF LIPASE: CPT

## 2021-10-16 PROCEDURE — 74011000636 HC RX REV CODE- 636: Performed by: EMERGENCY MEDICINE

## 2021-10-16 PROCEDURE — 81001 URINALYSIS AUTO W/SCOPE: CPT

## 2021-10-16 PROCEDURE — 74011250637 HC RX REV CODE- 250/637: Performed by: EMERGENCY MEDICINE

## 2021-10-16 PROCEDURE — 80053 COMPREHEN METABOLIC PANEL: CPT

## 2021-10-16 PROCEDURE — 85025 COMPLETE CBC W/AUTO DIFF WBC: CPT

## 2021-10-16 PROCEDURE — 74177 CT ABD & PELVIS W/CONTRAST: CPT

## 2021-10-16 PROCEDURE — 74011250636 HC RX REV CODE- 250/636: Performed by: EMERGENCY MEDICINE

## 2021-10-16 PROCEDURE — 99283 EMERGENCY DEPT VISIT LOW MDM: CPT

## 2021-10-16 PROCEDURE — 96374 THER/PROPH/DIAG INJ IV PUSH: CPT

## 2021-10-16 PROCEDURE — 36415 COLL VENOUS BLD VENIPUNCTURE: CPT

## 2021-10-16 RX ORDER — CIPROFLOXACIN 500 MG/1
500 TABLET ORAL 2 TIMES DAILY
Qty: 14 TABLET | Refills: 0 | Status: SHIPPED | OUTPATIENT
Start: 2021-10-16 | End: 2021-10-23

## 2021-10-16 RX ORDER — METRONIDAZOLE 500 MG/1
500 TABLET ORAL 3 TIMES DAILY
Qty: 21 TABLET | Refills: 0 | Status: SHIPPED | OUTPATIENT
Start: 2021-10-16 | End: 2021-10-23

## 2021-10-16 RX ORDER — ONDANSETRON 2 MG/ML
4 INJECTION INTRAMUSCULAR; INTRAVENOUS
Status: COMPLETED | OUTPATIENT
Start: 2021-10-16 | End: 2021-10-16

## 2021-10-16 RX ORDER — HYDROCODONE BITARTRATE AND ACETAMINOPHEN 5; 325 MG/1; MG/1
1 TABLET ORAL
Status: COMPLETED | OUTPATIENT
Start: 2021-10-16 | End: 2021-10-16

## 2021-10-16 RX ADMIN — ONDANSETRON 4 MG: 2 INJECTION INTRAMUSCULAR; INTRAVENOUS at 13:22

## 2021-10-16 RX ADMIN — IOPAMIDOL 100 ML: 755 INJECTION, SOLUTION INTRAVENOUS at 14:43

## 2021-10-16 RX ADMIN — HYDROCODONE BITARTRATE AND ACETAMINOPHEN 1 TABLET: 5; 325 TABLET ORAL at 15:43

## 2021-10-16 NOTE — ED TRIAGE NOTES
Triage note: pt with history of Diverticulosis. Pt started with left sided abdomen pain Thursday, no nausea or vomiting. 77yo F h/o HTN, HLD, sz, colitis p/w lower abd pain that started yesterday. r/o colitis v diverticulitis v UTI

## 2021-10-16 NOTE — ED PROVIDER NOTES
Please note that this dictation was completed with [employer], the computer voice recognition software.  Quite often unanticipated grammatical, syntax, homophones, and other interpretive errors are inadvertently transcribed by the computer software.  Please disregard these errors.  Please excuse any errors that have escaped final proofreading. Patient is a 70-year-old female with history of borderline diabetes, hypertension, diverticulitis, presenting to emergency department for evaluation of left-sided abdominal pain with onset 3 days ago. She has had associated nausea. She states she has had diverticulitis several years ago and her symptoms feel similar. She denies fever, chills, chest pain, vomiting, diarrhea, bloody stool, urinary changes, or any other medical complaints at this time. Prior abdominal surgeries include hernia repair and cholecystectomy. Last oral intake: Food yesterday afternoon around 12 PM, had some juice this morning.            Past Medical History:   Diagnosis Date    Adren[address]ule ([facility] 12/07/2014    NO TREATMENT NEEDED    Adverse effect of anesthesia 10/03/2016    \"TEND TO TAKE A LITTLE LONGER FOR EFFECT TO WEAR OFF\"    Adverse effect of anesthesia     NO KNOWN HISTORY OF ANESTHESIA PROBLEMS IN FAMILY    Arthritis     Bursitis of left hip     Chronic pain     HIP, KNEES    Common migraine 05/11/2010    PT DENIES THIS DIAGNOSIS ON 10/3/16    CRP elevated 5/11/2010    Diabetes ([facility]     borderline    Diverticulosis of sigmoid 05/2010    HAS HAD 2-3 EPISODES OF DIVERTICULITIS, PT STATES ON 10/3/16    Eczema 5/11/2010    Elevated cholesterol 5/11/2010    elevated particle number    Family history of early CAD 5/11/2010    Fe deficiency anemia 5/11/2010    H[facility]O mamm[facility] 7/12/2012    [facility]    HTN (hypertension) 5/11/2010    Leukopenia     Vitamin D deficiency 8/6/2010       Past Surgical History:   Procedure Laterality Date    ENDOSCOPY, COLON, DIAGNOSTIC 2/25/11    HX ACL RECONSTRUCTION Right 10/2016    HX CHOLECYSTECTOMY  11/01    laparoscopic    HX HERNIA REPAIR  4865    UMBILICAL    HX HYSTERECTOMY  8/98    ovaries spared    HX ORTHOPAEDIC Right 2003    BUNIONECTOMY    HX ORTHOPAEDIC Left 02/2016    TOTAL HIP         Family History:   Problem Relation Age of Onset    Colon Cancer Paternal Grandmother     Cancer Paternal Grandmother     Colon Cancer Paternal Grandfather     Arthritis-osteo Mother     Diabetes Mother     Elevated Lipids Mother     Hypertension Mother     Diabetes Father     Arthritis-osteo Father     Anesth Problems Neg Hx        Social History     Socioeconomic History    Marital status:      Spouse name: Not on file    Number of children: 2    Years of education: Not on file    Highest education level: Not on file   Occupational History    Occupation: Clerical   Tobacco Use    Smoking status: Never Smoker    Smokeless tobacco: Never Used   Vaping Use    Vaping Use: Never used   Substance and Sexual Activity    Alcohol use: Yes     Comment: rare    Drug use: No    Sexual activity: Yes     Partners: Male     Birth control/protection: None   Other Topics Concern    Not on file   Social History Narrative    Not on file     Social Determinants of Health     Financial Resource Strain:     Difficulty of Paying Living Expenses:    Food Insecurity:     Worried About Running Out of Food in the Last Year:     Ran Out of Food in the Last Year:    Transportation Needs:     Lack of Transportation (Medical):      Lack of Transportation (Non-Medical):    Physical Activity:     Days of Exercise per Week:     Minutes of Exercise per Session:    Stress:     Feeling of Stress :    Social Connections:     Frequency of Communication with Friends and Family:     Frequency of Social Gatherings with Friends and Family:     Attends [employer]oration[employer] Services:     Active Member of Clubs or Organizations:     Attends Club or Organization Meetings:     Marital Status:    Intimate Partner Violence:     Fear of Current or Ex-Partner:     Emotionally Abused:     Physically Abused:     Sexually Abused: ALLERGIES: Latex    Review of Systems   Constitutional: Negative for chills and fever. HENT: Negative for ear pain and sore throat. Eyes: Negative for visual disturbance. Respiratory: Negative for cough and shortness of breath. Cardiovascular: Negative for chest pain. Gastrointestinal: Positive for abdominal pain and nausea. Negative for blood in stool, diarrhea and vomiting. Genitourinary: Negative for dysuria, flank pain and hematuria. Musculoskeletal: Negative for back pain. Skin: Negative for color change. Neurological: Negative for dizziness and headaches. Psychiatric/Behavioral: Negative for confusion. Vitals:    10/16/21 1228   Pulse: 88   Resp: 16   Temp: 98 °F (36.7 °C)   SpO2: 96%   Weight: 101.6 kg (224 lb)   Height: 5' 2\" (1.575 m)            Physical Exam  Vitals and nursing note reviewed. Constitutional:       General: She is not in acute distress. Appearance: Normal appearance. She is not ill-appearing. HENT:      Head: Normocephalic and atraumatic. Mouth/Throat:      Pharynx: Oropharynx is clear. Eyes:      Extraocular Movements: Extraocular movements intact. Conjunctiva/sclera: Conjunctivae normal.   Cardiovascular:      Rate and Rhythm: Normal rate and regular rhythm. Pulmonary:      Effort: Pulmonary effort is normal.      Breath sounds: Normal breath sounds. Abdominal:      Palpations: Abdomen is soft. Tenderness: There is abdominal tenderness in the left lower quadrant. There is guarding. There is no rebound. Negative signs include De La Rosa's sign and McBurney's sign. Musculoskeletal:         General: Normal range of motion. Cervical back: No rigidity. Skin:     General: Skin is warm and dry.    Neurological:      General: No focal deficit present. Mental Status: She is alert and oriented to person, place, and time. Psychiatric:         Mood and Affect: Mood normal.          MDM  Number of Diagnoses or Management Options  Diagnosis management comments: Patient is alert, afebrile, vitals stable. Presents with abdominal pain x3 days. Abdomen is soft, nondistended, tenderness in left lower quadrant region. Lab work and urinalysis unremarkable. Abdominal CT scan consistent with uncomplicated sigmoid diverticulitis. Will start her on oral antibiotics and have her follow-up with PCP and GI. Amount and/or Complexity of Data Reviewed  Discuss the patient with other providers: yes (Discussed patient with ED attending Faviola Russ MD who agrees with current management plan. )      ED Course as of Oct 16 1524   Sat Oct 16, 2021   1524 IMPRESSION  1. Acute sigmoid diverticulitis. 2.  Trace pelvic free fluid likely reactive. 3.  Additional nonacute findings as above. CT ABD PELV W CONT [EP]      ED Course User Index  [EP] RICH Glass     3:26 PM  Pt has been reevaluated. There are no new complaints, changes, or physical findings at this time. All results have been reviewed with patient and/or family. Medications have been reviewed w/ pt and/or family. Pt and/or family's questions have been answered. Pt and/or family expressed good understanding of the dx/tx/rx and is in agreement with plan of care. Pt instructed and agreed to f/u w/ PCP and GI and to return to ED upon further deterioration. Pt is ready for discharge. IMPRESSION:  1. Sigmoid diverticulitis        PLAN:  1. Current Discharge Medication List      START taking these medications    Details   ciprofloxacin HCl (Cipro) 500 mg tablet Take 1 Tablet by mouth two (2) times a day for 7 days.   Qty: 14 Tablet, Refills: 0  Start date: 10/16/2021, End date: 10/23/2021      metroNIDAZOLE (FlagyL) 500 mg tablet Take 1 Tablet by mouth three (3) times daily for 7 days.  [address]: 21 Tablet, Refills: 0  Start date: 10/16/2021, End date: 10/23/2021           2.    Follow-up Information     Follow up With Specialties Details Why Contact Info    Tamiko Brown NP Family Medicine Schedule an appointment as soon as possible for a visit   [address] [phone]      [facility]  Schedule an appointment as soon as possible for a visit   [address] [facility] [address] [facility] [address] [phone]            Return to ED if worse     Procedures

## 2022-03-19 PROBLEM — I10 HTN (HYPERTENSION), BENIGN: Status: ACTIVE | Noted: 2017-11-29

## 2022-03-19 PROBLEM — R73.02 GLUCOSE INTOLERANCE (IMPAIRED GLUCOSE TOLERANCE): Status: ACTIVE | Noted: 2017-11-29

## 2022-03-19 PROBLEM — E66.01 OBESITY, MORBID (HCC): Status: ACTIVE | Noted: 2017-11-29

## 2022-03-22 ENCOUNTER — TELEPHONE (OUTPATIENT)
Dept: FAMILY MEDICINE CLINIC | Age: 62
End: 2022-03-22

## 2022-03-22 NOTE — TELEPHONE ENCOUNTER
Outbound call to patient to schedule follow up appointment had to leave a voicemail.     Best call back #[phone]

## 2022-03-29 ENCOUNTER — OFFICE VISIT (OUTPATIENT)
Dept: FAMILY MEDICINE CLINIC | Age: 62
End: 2022-03-29
Payer: COMMERCIAL

## 2022-03-29 VITALS
SYSTOLIC BLOOD PRESSURE: 122 MMHG | OXYGEN SATURATION: 96 % | BODY MASS INDEX: 39.93 KG/M2 | RESPIRATION RATE: 16 BRPM | HEIGHT: 62 IN | DIASTOLIC BLOOD PRESSURE: 84 MMHG | TEMPERATURE: 99.1 F | WEIGHT: 217 LBS | HEART RATE: 85 BPM

## 2022-03-29 DIAGNOSIS — R73.02 GLUCOSE INTOLERANCE (IMPAIRED GLUCOSE TOLERANCE): ICD-10-CM

## 2022-03-29 DIAGNOSIS — E78.00 ELEVATED CHOLESTEROL: ICD-10-CM

## 2022-03-29 DIAGNOSIS — R25.2 LEG CRAMPING: ICD-10-CM

## 2022-03-29 DIAGNOSIS — I10 HTN (HYPERTENSION), BENIGN: Primary | ICD-10-CM

## 2022-03-29 DIAGNOSIS — E66.01 OBESITY, MORBID (HCC): ICD-10-CM

## 2022-03-29 LAB
ALBUMIN SERPL-MCNC: 4 G/DL (ref 3.5–5)
ALBUMIN/GLOB SERPL: 1 {RATIO} (ref 1.1–2.2)
ALP SERPL-CCNC: 101 U/L (ref 45–117)
ALT SERPL-CCNC: 49 U/L (ref 12–78)
ANION GAP SERPL CALC-SCNC: 3 MMOL/L (ref 5–15)
AST SERPL-CCNC: 24 U/L (ref 15–37)
BILIRUB SERPL-MCNC: 0.5 MG/DL (ref 0.2–1)
BUN SERPL-MCNC: 13 MG/DL (ref 6–20)
BUN/CREAT SERPL: 19 (ref 12–20)
CALCIUM SERPL-MCNC: 9 MG/DL (ref 8.5–10.1)
CHLORIDE SERPL-SCNC: 105 MMOL/L (ref 97–108)
CHOLEST SERPL-MCNC: 162 MG/DL
CO2 SERPL-SCNC: 28 MMOL/L (ref 21–32)
CREAT SERPL-MCNC: 0.68 MG/DL (ref 0.55–1.02)
ERYTHROCYTE [DISTWIDTH] IN BLOOD BY AUTOMATED COUNT: 14.3 % (ref 11.5–14.5)
EST. AVERAGE GLUCOSE BLD GHB EST-MCNC: 137 MG/DL
GLOBULIN SER CALC-MCNC: 4.1 G/DL (ref 2–4)
GLUCOSE SERPL-MCNC: 98 MG/DL (ref 65–100)
HBA1C MFR BLD: 6.4 % (ref 4–5.6)
HCT VFR BLD AUTO: 43.6 % (ref 35–47)
HDLC SERPL-MCNC: 79 MG/DL
HDLC SERPL: 2.1 {RATIO} (ref 0–5)
HGB BLD-MCNC: 13.6 G/DL (ref 11.5–16)
LDLC SERPL CALC-MCNC: 64.6 MG/DL (ref 0–100)
MCH RBC QN AUTO: 28.9 PG (ref 26–34)
MCHC RBC AUTO-ENTMCNC: 31.2 G/DL (ref 30–36.5)
MCV RBC AUTO: 92.6 FL (ref 80–99)
NRBC # BLD: 0 K/UL (ref 0–0.01)
NRBC BLD-RTO: 0 PER 100 WBC
PLATELET # BLD AUTO: 260 K/UL (ref 150–400)
PMV BLD AUTO: 10 FL (ref 8.9–12.9)
POTASSIUM SERPL-SCNC: 4.1 MMOL/L (ref 3.5–5.1)
PROT SERPL-MCNC: 8.1 G/DL (ref 6.4–8.2)
RBC # BLD AUTO: 4.71 M/UL (ref 3.8–5.2)
SODIUM SERPL-SCNC: 136 MMOL/L (ref 136–145)
TRIGL SERPL-MCNC: 92 MG/DL (ref ?–150)
VLDLC SERPL CALC-MCNC: 18.4 MG/DL
WBC # BLD AUTO: 4.2 K/UL (ref 3.6–11)

## 2022-03-29 PROCEDURE — 99214 OFFICE O/P EST MOD 30 MIN: CPT | Performed by: NURSE PRACTITIONER

## 2022-03-29 RX ORDER — MAGNESIUM 200 MG
TABLET ORAL
Qty: 30 TABLET | Refills: 2 | Status: SHIPPED | OUTPATIENT
Start: 2022-03-29

## 2022-03-29 NOTE — PROGRESS NOTES
Chief Complaint   Patient presents with    Hypertension     follow up     Cholesterol Problem    Leg Pain     leg cramps more at night      1. Have you been to the ER, urgent care clinic since your last visit? Hospitalized since your last visit? No    2. Have you seen or consulted any other health care providers outside of the [address] [place] [address] since your last visit? Include any pap smears or colon screening.  No

## 2022-03-29 NOTE — PROGRESS NOTES
[address] [facility] Note  Subjective:      Marcelo Roman is a 58 y.o. female who presents for follow up on chronic problems, she has history of hypertension,pre diabetes, hyperlipidemia and obesity. Taking medications as prescribed, no medications side effects reported  Today she is complaining of leg cramps, especially at night. That wakes he up. Past Medical History:   Diagnosis Date    Adrenal nodule ([facility] 12/07/2014    NO TREATMENT NEEDED    Adverse effect of anesthesia 10/03/2016    \"TEND TO TAKE A LITTLE LONGER FOR EFFECT TO WEAR OFF\"    Adverse effect of anesthesia     NO KNOWN HISTORY OF ANESTHESIA PROBLEMS IN FAMILY    Arthritis     Bursitis of left hip     Chronic pain     HIP, KNEES    Common migraine 05/11/2010    PT DENIES THIS DIAGNOSIS ON 10/3/16    CRP elevated 5/11/2010    Diabetes ([facility] [address]     borderline    Diverticulosis of sigmoid 05/2010    HAS HAD 2-3 EPISODES OF DIVERTICULITIS, PT STATES ON 10/3/16    Eczema 5/11/2010    Elevated cholesterol 5/11/2010    elevated particle number    Family history of early CAD 5/11/2010    Fe deficiency anemia 5/11/2010    H/O mamm[facility] 7/12/2012    [facility]    HTN (hypertension) 5/11/2010    Leukopenia     Vitamin D deficiency 8/6/2010     Past Surgical History:   Procedure Laterality Date    ENDOSCOPY, COLON, DIAGNOSTIC  2/25/11    HX ACL RECONSTRUCTION Right 10/2016    HX CHOLECYSTECTOMY  11/01    laparoscopic    HX HERNIA REPAIR  5725    UMBILICAL    HX HYSTERECTOMY  8/98    ovaries spared    HX ORTHOPAEDIC Right 2003    BUNIONECTOMY    HX ORTHOPAEDIC Left 02/2016    TOTAL HIP       Current Outpatient Medications   Medication Sig Dispense Refill    magnesium 200 mg tab Take one pill one hour before sleep 30 Tablet 2    cholecalciferol (Vitamin D3) 25 mcg (1,000 unit) cap Take 1 Capsule by mouth daily.  90 Capsule 2    lisinopril-hydroCHLOROthiazide (PRINZIDE, ZESTORETIC) 20-12.5 mg per tablet Take 1 Tablet by mouth daily. 90 Tablet 1    atorvastatin (LIPITOR) 40 mg tablet Take 1 Tablet by mouth daily. 90 Tablet 1    metFORMIN ER (GLUCOPHAGE XR) 500 mg tablet Take 1 Tab by mouth daily (with dinner). 90 Tab 1    MULTI-VITAMIN PO Take 1 Tab by mouth daily. ONE A DAY VITAMIN       Allergies   Allergen Reactions    Latex Itching     ITCHING, SWELLING       ROS:   Complete review of systems was reviewed with pertinent information listed in HPI. Review of Systems   Constitutional: Negative. HENT: Negative. Respiratory: Negative. Cardiovascular: Negative. Gastrointestinal: Negative. Genitourinary: Negative. Musculoskeletal:        Leg cramps   Psychiatric/Behavioral: Negative. Objective:     Visit Vitals  /84 (BP 1 Location: Right arm, BP Patient Position: Sitting, BP Cuff Size: Adult long)   Pulse 85   Temp 99.1 °F (37.3 °C) (Temporal)   Resp 16   Ht 5' 2\" (1.575 m)   Wt 217 lb (98.4 kg)   SpO2 96%   BMI 39.69 kg/m²       Vitals and Nurse Documentation reviewed. Physical Exam  Constitutional:       Appearance: Normal appearance. She is obese. HENT:      Mouth/Throat:      Mouth: Mucous membranes are moist.   Cardiovascular:      Rate and Rhythm: Normal rate and regular rhythm. Pulses: Normal pulses. Heart sounds: Normal heart sounds. No murmur heard. Pulmonary:      Breath sounds: Normal breath sounds. Abdominal:      General: Bowel sounds are normal.      Palpations: Abdomen is soft. Musculoskeletal:      Cervical back: Normal range of motion and neck supple. Neurological:      Mental Status: She is alert. Psychiatric:         Mood and Affect: Mood normal.         Thought Content: Thought content normal.         Assessment/Plan:     Diagnoses and all orders for this visit:    1. HTN (hypertension), benign  -     CBC W/O DIFF; Future    2. Elevated cholesterol  -     LIPID PANEL; Future  -     METABOLIC PANEL, COMPREHENSIVE; Future    3.  Glucose intolerance (impaired glucose tolerance)  -     HEMOGLOBIN A1C WITH EAG; Future    4. Obesity, morbid (HCC)    5. Leg cramping  -     magnesium 200 mg tab;  Take one pill one hour before sleep        -    Calcium 500 mg         -    Take this for two weeks, then as needed one hour before going to bed  Follow up in six months      Pt expressed understanding with the diagnosis and plan        Discussed expected course/resolution/complications of diagnosis in detail with patient.    Medication risks/benefits/costs/interactions/alternatives discussed with patient.    Pt was given an after visit summary which includes diagnoses, current medications & vitals.  Pt expressed understanding with the diagnosis and plan

## 2022-04-13 DIAGNOSIS — I10 HTN (HYPERTENSION), BENIGN: ICD-10-CM

## 2022-04-13 RX ORDER — LISINOPRIL AND HYDROCHLOROTHIAZIDE 12.5; 2 MG/1; MG/1
TABLET ORAL
Qty: 90 TABLET | Refills: 1 | Status: SHIPPED | OUTPATIENT
Start: 2022-04-13 | End: 2022-09-27

## 2022-04-15 DIAGNOSIS — E78.00 ELEVATED CHOLESTEROL: ICD-10-CM

## 2022-04-15 RX ORDER — ATORVASTATIN CALCIUM 40 MG/1
TABLET, FILM COATED ORAL
Qty: 90 TABLET | Refills: 1 | Status: SHIPPED | OUTPATIENT
Start: 2022-04-15

## 2022-06-29 ENCOUNTER — OFFICE VISIT (OUTPATIENT)
Dept: URGENT CARE | Age: 62
End: 2022-06-29
Payer: COMMERCIAL

## 2022-06-29 VITALS
RESPIRATION RATE: 16 BRPM | HEART RATE: 111 BPM | OXYGEN SATURATION: 96 % | TEMPERATURE: 97.9 F | DIASTOLIC BLOOD PRESSURE: 84 MMHG | SYSTOLIC BLOOD PRESSURE: 130 MMHG

## 2022-06-29 DIAGNOSIS — J06.9 VIRAL UPPER RESPIRATORY TRACT INFECTION: ICD-10-CM

## 2022-06-29 DIAGNOSIS — R05.9 COUGH: Primary | ICD-10-CM

## 2022-06-29 DIAGNOSIS — J02.9 SORE THROAT: ICD-10-CM

## 2022-06-29 LAB
S PYO AG THROAT QL: NEGATIVE
SARS-COV-2 PCR, POC: NEGATIVE
VALID INTERNAL CONTROL?: YES

## 2022-06-29 PROCEDURE — 87880 STREP A ASSAY W/OPTIC: CPT | Performed by: FAMILY MEDICINE

## 2022-06-29 PROCEDURE — 87635 SARS-COV-2 COVID-19 AMP PRB: CPT | Performed by: FAMILY MEDICINE

## 2022-06-29 PROCEDURE — 99213 OFFICE O/P EST LOW 20 MIN: CPT | Performed by: FAMILY MEDICINE

## 2022-06-29 NOTE — PATIENT INSTRUCTIONS
Take Dayquil tab /cap 2  3-4 / day   Nyquil liquid 20 cc @ bed time  Motrin   Flonase   Fluids and gargles

## 2022-06-30 NOTE — PROGRESS NOTES
Cold Symptoms  The history is provided by the patient. This is a new problem. The current episode started 2 days ago. The problem occurs every few minutes. The problem has not changed since onset. The cough is non-productive. There has been no fever. Associated symptoms include rhinorrhea and sore throat. Pertinent negatives include no chills, no shortness of breath and no nausea. She has tried nothing for the symptoms. Risk factors: no covid exposure. She is not a smoker. Her past medical history does not include asthma.         Past Medical History:   Diagnosis Date    Adrenal nodule ([facility] [address] 12/07/2014    NO TREATMENT NEEDED    Adverse effect of anesthesia 10/03/2016    \"TEND TO TAKE A LITTLE LONGER FOR EFFECT TO WEAR OFF\"    Adverse effect of anesthesia     NO KNOWN HISTORY OF ANESTHESIA PROBLEMS IN FAMILY    Arthritis     Bursitis of left hip     Chronic pain     HIP, KNEES    Common migraine 05/11/2010    PT DENIES THIS DIAGNOSIS ON 10/3/16    CRP elevated 5/11/2010    Diabetes ([facility] [address]     borderline    Diverticulosis of sigmoid 05/2010    HAS HAD 2-3 EPISODES OF DIVERTICULITIS, PT STATES ON 10/3/16    Eczema 5/11/2010    Elevated cholesterol 5/11/2010    elevated particle number    Family history of early CAD 5/11/2010    Fe deficiency anemia 5/11/2010    H/O mammogram 7/12/2012    [facility]    HTN (hypertension) 5/11/2010    Leukopenia     Vitamin D deficiency 8/6/2010        Past Surgical History:   Procedure Laterality Date    ENDOSCOPY, COLON, DIAGNOSTIC  2/25/11    HX ACL RECONSTRUCTION Right 10/2016    HX CHOLECYSTECTOMY  11/01    laparoscopic    HX HERNIA REPAIR  5782    UMBILICAL    HX HYSTERECTOMY  8/98    ovaries spared    HX ORTHOPAEDIC Right 2003    BUNIONECTOMY    HX ORTHOPAEDIC Left 02/2016    TOTAL HIP         Family History   Problem Relation Age of Onset    Colon Cancer Paternal Grandmother     Cancer Paternal Grandmother     Colon Cancer Paternal Grandfather     OSTEOARTHRITIS Mother     Diabetes Mother     Elevated Lipids Mother     Hypertension Mother     Diabetes Father    Rasmussen OSTEOARTHRITIS Father     Anesth Problems Neg Hx         Social History     Socioeconomic History    Marital status:      Spouse name: Not on file    Number of children: 2    Years of education: Not on file    Highest education level: Not on file   Occupational History    Occupation: Clerical   Tobacco Use    Smoking status: Never Smoker    Smokeless tobacco: Never Used   Vaping Use    Vaping Use: Never used   Substance and Sexual Activity    Alcohol use: Yes     Comment: rare    Drug use: No    Sexual activity: Yes     Partners: Male     Birth control/protection: None   Other Topics Concern    Not on file   Social History Narrative    Not on file     Social Determinants of Health     Financial Resource Strain:     Difficulty of Paying Living Expenses: Not on file   Food Insecurity:     Worried About Running Out of Food in the Last Year: Not on file    Nikhil of Food in the Last Year: Not on file   Transportation Needs:     Lack of Transportation (Medical): Not on file    Lack of Transportation (Non-Medical):  Not on file   Physical Activity:     Days of Exercise per Week: Not on file    Minutes of Exercise per Session: Not on file   Stress:     Feeling of Stress : Not on file   Social Connections:     Frequency of Communication with Friends and Family: Not on file    Frequency of Social Gatherings with Friends and Family: Not on file    Attends Sabianist Services: Not on file    Active Member of Clubs or Organizations: Not on file    Attends Club or Organization Meetings: Not on file    Marital Status: Not on file   Intimate Partner Violence:     Fear of Current or Ex-Partner: Not on file    Emotionally Abused: Not on file    Physically Abused: Not on file    Sexually Abused: Not on file   Housing Stability:     Unable to Pay for Housing in the Last Year: Not on file    Number of Places Lived in the Last Year: Not on file    Unstable Housing in the Last Year: Not on file                ALLERGIES: Latex    Review of Systems   Constitutional: Negative for chills and fatigue. HENT: Positive for congestion, postnasal drip, rhinorrhea and sore throat. Respiratory: Positive for cough. Negative for shortness of breath. Gastrointestinal: Negative for nausea. All other systems reviewed and are negative. Vitals:    06/29/22 1904   BP: 130/84   Pulse: (!) 111   Resp: 16   Temp: 97.9 °F (36.6 °C)   SpO2: 96%       Physical Exam  Vitals and nursing note reviewed. Constitutional:       General: She is not in acute distress. HENT:      Right Ear: Tympanic membrane and ear canal normal.      Left Ear: Tympanic membrane and ear canal normal.      Nose: Nose normal.      Mouth/Throat:      Pharynx: No oropharyngeal exudate or posterior oropharyngeal erythema. Eyes:      General:         Right eye: No discharge. Left eye: No discharge. Conjunctiva/sclera: Conjunctivae normal.   Pulmonary:      Effort: Pulmonary effort is normal. No respiratory distress. Breath sounds: Normal breath sounds. No wheezing or rales. Musculoskeletal:      Cervical back: Neck supple. Lymphadenopathy:      Cervical: No cervical adenopathy. Skin:     Findings: No rash. MDM    Procedures      ICD-10-CM ICD-9-CM    1. Cough  R05.9 786.2 POCT COVID-19, SARS-COV-2, PCR      AMB POC RAPID STREP A   2. Sore throat  J02.9 462 POCT COVID-19, SARS-COV-2, PCR      AMB POC RAPID STREP A   3. Viral upper respiratory tract infection  J06.9 465.9    Take Dayquil tab /cap 2  3-4 / day   Nyquil liquid 20 cc @ bed time  Motrin   Flonase   Fluids and gargles         No orders of the defined types were placed in this encounter.     Results for orders placed or performed in visit on 06/29/22   POCT COVID-19, SARS-COV-2, PCR   Result Value Ref Range SARS-COV-2 PCR, POC Negative Negative   AMB POC RAPID STREP A   Result Value Ref Range    VALID INTERNAL CONTROL POC Yes     Group A Strep Ag Negative Negative     The patients condition was discussed with the patient and they understand. The patient is to follow up with primary care doctor. If signs and symptoms become worse the pt is to go to the ER. The patient is to take medications as prescribed.

## 2022-07-10 RX ORDER — GLUCOSAMINE SULFATE 1500 MG
POWDER IN PACKET (EA) ORAL
Qty: 90 CAPSULE | Refills: 2 | Status: SHIPPED | OUTPATIENT
Start: 2022-07-10

## 2022-09-02 ENCOUNTER — OFFICE VISIT (OUTPATIENT)
Dept: FAMILY MEDICINE CLINIC | Age: 62
End: 2022-09-02
Payer: COMMERCIAL

## 2022-09-02 VITALS
DIASTOLIC BLOOD PRESSURE: 77 MMHG | HEART RATE: 95 BPM | HEIGHT: 62 IN | WEIGHT: 220 LBS | BODY MASS INDEX: 40.48 KG/M2 | OXYGEN SATURATION: 98 % | RESPIRATION RATE: 18 BRPM | SYSTOLIC BLOOD PRESSURE: 134 MMHG | TEMPERATURE: 97.6 F

## 2022-09-02 DIAGNOSIS — I10 HTN (HYPERTENSION), BENIGN: Primary | ICD-10-CM

## 2022-09-02 DIAGNOSIS — R73.03 PREDIABETES: ICD-10-CM

## 2022-09-02 DIAGNOSIS — E66.01 OBESITY, MORBID (HCC): ICD-10-CM

## 2022-09-02 DIAGNOSIS — E78.00 ELEVATED CHOLESTEROL: ICD-10-CM

## 2022-09-02 PROCEDURE — 99213 OFFICE O/P EST LOW 20 MIN: CPT | Performed by: NURSE PRACTITIONER

## 2022-09-02 NOTE — PROGRESS NOTES
[facility] ER Note  Subjective:      Steve Sam is a 58 y.o. female who presents for follow up on chronic problems. She has history of hypertension, hyperlipidemia, diabetes and morbid obesity. Taking medications as prescribed, no medications side effects reported . Mammogram was done 2 weeks ago at [facility], colonoscopy done last year [employer], will requesting results.     Past Medical History:   Diagnosis Date    Adrenal nodule ([facility] [address] 12/07/2014    NO TREATMENT NEEDED    Adverse effect of anesthesia 10/03/2016    \"TEND TO TAKE A LITTLE LONGER FOR EFFECT TO WEAR OFF\"    Adverse effect of anesthesia     NO KNOWN HISTORY OF ANESTHESIA PROBLEMS IN FAMILY    Arthritis     Bursitis of left hip     Chronic pain     HIP, KNEES    Common migraine 05/11/2010    PT DENIES THIS DIAGNOSIS ON 10/3/16    CRP elevated 5/11/2010    Diabetes ([facility] [address]     borderline    Diverticulosis of sigmoid 05/2010    HAS HAD 2-3 EPISODES OF DIVERTICULITIS, PT STATES ON 10/3/16    Eczema 5/11/2010    Elevated cholesterol 5/11/2010    elevated particle number    Family history of early CAD 5/11/2010    Fe deficiency anemia 5/11/2010    H/O mammogram 7/12/2012    [address]    HTN (hypertension) 5/11/2010    Leukopenia     Vitamin D deficiency 8/6/2010     Past Surgical History:   Procedure Laterality Date    ENDOSCOPY, COLON, DIAGNOSTIC  2/25/11    HX ACL RECONSTRUCTION Right 10/2016    HX CHOLECYSTECTOMY  11/01    laparoscopic    HX HERNIA REPAIR  6252    UMBILICAL    HX HYSTERECTOMY  8/98    ovaries spared    HX ORTHOPAEDIC Right 2003    BUNIONECTOMY    HX ORTHOPAEDIC Left 02/2016    TOTAL HIP       Current Outpatient Medications   Medication Sig Dispense Refill    cholecalciferol (VITAMIN D3) 25 mcg (1,000 unit) cap TAKE 1 CAPSULE BY MOUTH EVERY DAY 90 Capsule 2    atorvastatin (LIPITOR) 40 mg tablet TAKE 1 TABLET BY MOUTH EVERY DAY 90 Tablet 1    lisinopril-hydroCHLOROthiazide (Levorn Quinton) 20-12.5 mg per tablet TAKE 1 TABLET BY MOUTH EVERY DAY 90 Tablet 1    magnesium 200 mg tab Take one pill one hour before sleep 30 Tablet 2    metFORMIN ER (GLUCOPHAGE XR) 500 mg tablet Take 1 Tab by mouth daily (with dinner). 90 Tab 1    MULTI-VITAMIN PO Take 1 Tab by mouth daily. ONE A DAY VITAMIN       Allergies   Allergen Reactions    Latex Itching     ITCHING, SWELLING       ROS:   Complete review of systems was reviewed with pertinent information listed in HPI. Review of Systems   Constitutional: Negative. HENT: Negative. Respiratory: Negative. Cardiovascular: Negative. Gastrointestinal: Negative. Genitourinary: Negative. Musculoskeletal: Negative. Objective:   Visit Vitals  /77 (BP 1 Location: Left upper arm, BP Patient Position: Sitting, BP Cuff Size: Adult)   Pulse 95   Temp 97.6 °F (36.4 °C) (Temporal)   Resp 18   Ht 5' 2\" (1.575 m)   Wt 220 lb (99.8 kg)   SpO2 98%   BMI 40.24 kg/m²       Vitals and Nurse Documentation reviewed. Physical Exam  Constitutional:       Appearance: Normal appearance. HENT:      Nose: Nose normal.   Cardiovascular:      Rate and Rhythm: Normal rate and regular rhythm. Pulses: Normal pulses. Heart sounds: Normal heart sounds. No murmur heard. Pulmonary:      Effort: Pulmonary effort is normal.      Breath sounds: Normal breath sounds. Abdominal:      General: Bowel sounds are normal.      Palpations: Abdomen is soft. Musculoskeletal:      Cervical back: Normal range of motion and neck supple. Neurological:      Mental Status: She is alert. Psychiatric:         Mood and Affect: Mood normal.         Thought Content: Thought content normal.       Assessment/Plan:     Diagnoses and all orders for this visit:    1. HTN (hypertension), benign  -     CBC W/O DIFF; Future    2. Elevated cholesterol  -     LIPID PANEL; Future  -     METABOLIC PANEL, COMPREHENSIVE; Future    3. Obesity, morbid ([address]    4.  Prediabetes  -     HEMOGLOBIN A1C WITH EAG; Future    Await labs  Follow up in six months      Pt expressed understanding with the diagnosis and plan        Discussed expected course/resolution/complications of diagnosis in detail with patient. Medication risks/benefits/costs/interactions/alternatives discussed with patient. Pt was given an after visit summary which includes diagnoses, current medications & vitals.   Pt expressed understanding with the diagnosis and plan

## 2022-09-03 LAB
ALBUMIN SERPL-MCNC: 3.9 G/DL (ref 3.5–5)
ALBUMIN/GLOB SERPL: 1.1 {RATIO} (ref 1.1–2.2)
ALP SERPL-CCNC: 88 U/L (ref 45–117)
ALT SERPL-CCNC: 37 U/L (ref 12–78)
ANION GAP SERPL CALC-SCNC: 9 MMOL/L (ref 5–15)
AST SERPL-CCNC: 24 U/L (ref 15–37)
BILIRUB SERPL-MCNC: 0.4 MG/DL (ref 0.2–1)
BUN SERPL-MCNC: 17 MG/DL (ref 6–20)
BUN/CREAT SERPL: 25 (ref 12–20)
CALCIUM SERPL-MCNC: 9.2 MG/DL (ref 8.5–10.1)
CHLORIDE SERPL-SCNC: 107 MMOL/L (ref 97–108)
CHOLEST SERPL-MCNC: 145 MG/DL
CO2 SERPL-SCNC: 27 MMOL/L (ref 21–32)
CREAT SERPL-MCNC: 0.67 MG/DL (ref 0.55–1.02)
ERYTHROCYTE [DISTWIDTH] IN BLOOD BY AUTOMATED COUNT: 14.4 % (ref 11.5–14.5)
EST. AVERAGE GLUCOSE BLD GHB EST-MCNC: 137 MG/DL
GLOBULIN SER CALC-MCNC: 3.5 G/DL (ref 2–4)
GLUCOSE SERPL-MCNC: 89 MG/DL (ref 65–100)
HBA1C MFR BLD: 6.4 % (ref 4–5.6)
HCT VFR BLD AUTO: 37.8 % (ref 35–47)
HDLC SERPL-MCNC: 66 MG/DL
HDLC SERPL: 2.2 {RATIO} (ref 0–5)
HGB BLD-MCNC: 12 G/DL (ref 11.5–16)
LDLC SERPL CALC-MCNC: 63.8 MG/DL (ref 0–100)
MCH RBC QN AUTO: 29.9 PG (ref 26–34)
MCHC RBC AUTO-ENTMCNC: 31.7 G/DL (ref 30–36.5)
MCV RBC AUTO: 94.3 FL (ref 80–99)
NRBC # BLD: 0 K/UL (ref 0–0.01)
NRBC BLD-RTO: 0 PER 100 WBC
PLATELET # BLD AUTO: 225 K/UL (ref 150–400)
PMV BLD AUTO: 10.4 FL (ref 8.9–12.9)
POTASSIUM SERPL-SCNC: 3.6 MMOL/L (ref 3.5–5.1)
PROT SERPL-MCNC: 7.4 G/DL (ref 6.4–8.2)
RBC # BLD AUTO: 4.01 M/UL (ref 3.8–5.2)
SODIUM SERPL-SCNC: 143 MMOL/L (ref 136–145)
TRIGL SERPL-MCNC: 76 MG/DL (ref ?–150)
VLDLC SERPL CALC-MCNC: 15.2 MG/DL
WBC # BLD AUTO: 3.1 K/UL (ref 3.6–11)

## 2022-09-27 DIAGNOSIS — I10 HTN (HYPERTENSION), BENIGN: ICD-10-CM

## 2022-09-27 RX ORDER — LISINOPRIL AND HYDROCHLOROTHIAZIDE 12.5; 2 MG/1; MG/1
TABLET ORAL
Qty: 90 TABLET | Refills: 1 | Status: SHIPPED | OUTPATIENT
Start: 2022-09-27

## 2022-12-26 ENCOUNTER — OFFICE VISIT (OUTPATIENT)
Dept: URGENT CARE | Age: 62
End: 2022-12-26
Payer: COMMERCIAL

## 2022-12-26 VITALS
RESPIRATION RATE: 80 BRPM | WEIGHT: 219.8 LBS | HEART RATE: 84 BPM | BODY MASS INDEX: 40.2 KG/M2 | DIASTOLIC BLOOD PRESSURE: 83 MMHG | TEMPERATURE: 98.5 F | OXYGEN SATURATION: 99 % | SYSTOLIC BLOOD PRESSURE: 135 MMHG

## 2022-12-26 DIAGNOSIS — U07.1 COVID-19 VIRUS INFECTION: ICD-10-CM

## 2022-12-26 DIAGNOSIS — R09.81 NASAL CONGESTION: Primary | ICD-10-CM

## 2022-12-26 DIAGNOSIS — R51.9 NONINTRACTABLE HEADACHE, UNSPECIFIED CHRONICITY PATTERN, UNSPECIFIED HEADACHE TYPE: ICD-10-CM

## 2022-12-26 LAB
FLUAV+FLUBV AG NOSE QL IA.RAPID: NEGATIVE
FLUAV+FLUBV AG NOSE QL IA.RAPID: NEGATIVE
SARS-COV-2 PCR, POC: POSITIVE
VALID INTERNAL CONTROL?: YES

## 2022-12-26 PROCEDURE — 87804 INFLUENZA ASSAY W/OPTIC: CPT | Performed by: FAMILY MEDICINE

## 2022-12-26 PROCEDURE — 3074F SYST BP LT 130 MM HG: CPT | Performed by: FAMILY MEDICINE

## 2022-12-26 PROCEDURE — 99213 OFFICE O/P EST LOW 20 MIN: CPT | Performed by: FAMILY MEDICINE

## 2022-12-26 PROCEDURE — 3078F DIAST BP <80 MM HG: CPT | Performed by: FAMILY MEDICINE

## 2022-12-26 PROCEDURE — 87635 SARS-COV-2 COVID-19 AMP PRB: CPT | Performed by: FAMILY MEDICINE

## 2022-12-26 RX ORDER — DICLOFENAC SODIUM 20 MG/G
SOLUTION TOPICAL
COMMUNITY
Start: 2022-10-20

## 2022-12-26 NOTE — PROGRESS NOTES
Cough  The history is provided by the Patient. This is a new problem. The current episode started 2 days ago. The problem occurs constantly. The problem has been gradually worsening. The cough is Non-productive. Patient reports a subjective fever - was not measured. Associated symptoms include rhinorrhea (and nasal congestion), sore throat (scratchy) and myalgias. Pertinent negatives include no chills, no ear congestion, no ear pain, no wheezing and no vomiting. She has tried nothing for the symptoms. Risk factors: possible coviod exposure. She is not a smoker. Her past medical history is significant for bronchitis. Her past medical history does not include asthma.       Past Medical History:   Diagnosis Date    Adrenal nodule ([facility] [address] 12/07/2014    NO TREATMENT NEEDED    Adverse effect of anesthesia 10/03/2016    \"TEND TO TAKE A LITTLE LONGER FOR EFFECT TO WEAR OFF\"    Adverse effect of anesthesia     NO KNOWN HISTORY OF ANESTHESIA PROBLEMS IN FAMILY    Arthritis     Bursitis of left hip     Chronic pain     HIP, KNEES    Common migraine 05/11/2010    PT DENIES THIS DIAGNOSIS ON 10/3/16    CRP elevated 5/11/2010    Diabetes ([facility] [address]     borderline    Diverticulosis of sigmoid 05/2010    HAS HAD 2-3 EPISODES OF DIVERTICULITIS, PT STATES ON 10/3/16    Eczema 5/11/2010    Elevated cholesterol 5/11/2010    elevated particle number    Family history of early CAD 5/11/2010    Fe deficiency anemia 5/11/2010    H/O mammogram 7/12/2012    [facility]    HTN (hypertension) 5/11/2010    Leukopenia     Vitamin D deficiency 8/6/2010        Past Surgical History:   Procedure Laterality Date    ENDOSCOPY, COLON, DIAGNOSTIC  2/25/11    HX ACL RECONSTRUCTION Right 10/2016    HX CHOLECYSTECTOMY  11/01    laparoscopic    HX HERNIA REPAIR  0444    UMBILICAL    HX HYSTERECTOMY  8/98    ovaries spared    HX ORTHOPAEDIC Right 2003    BUNIONECTOMY    HX ORTHOPAEDIC Left 02/2016    TOTAL HIP         Family History   Problem Relation Age of Onset    Colon Cancer Paternal Grandmother     Cancer Paternal Grandmother     Colon Cancer Paternal Grandfather     OSTEOARTHRITIS Mother     Diabetes Mother     Elevated Lipids Mother     Hypertension Mother     Diabetes Father     OSTEOARTHRITIS Father     Anesth Problems Neg Hx         Social History     Socioeconomic History    Marital status:      Spouse name: Not on file    Number of children: 2    Years of education: Not on file    Highest education level: Not on file   Occupational History    Occupation: Clerical   Tobacco Use    Smoking status: Never    Smokeless tobacco: Never   Vaping Use    Vaping Use: Never used   Substance and Sexual Activity    Alcohol use: Yes     Comment: rare    Drug use: No    Sexual activity: Yes     Partners: Male     Birth control/protection: None   Other Topics Concern    Not on file   Social History Narrative    Not on file     Social Determinants of Health     Financial Resource Strain: Not on file   Food Insecurity: Not on file   Transportation Needs: Not on file   Physical Activity: Not on file   Stress: Not on file   Social Connections: Not on file   Intimate Partner Violence: Not on file   Housing Stability: Not on file                ALLERGIES: Latex    Review of Systems   Constitutional:  Negative for chills and fever. HENT:  Positive for congestion, rhinorrhea (and nasal congestion) and sore throat (scratchy). Negative for ear pain. Respiratory:  Positive for cough. Negative for chest tightness and wheezing. Gastrointestinal:  Negative for vomiting. Musculoskeletal:  Positive for myalgias. All other systems reviewed and are negative. Vitals:    12/26/22 1228 12/26/22 1231   BP: (!) 144/93 135/83   Pulse: 84    Resp: (!) 80    Temp: 98.5 °F (36.9 °C)    SpO2: 99%    Weight: 219 lb 12.8 oz (99.7 kg)        Physical Exam  Vitals and nursing note reviewed. Constitutional:       General: She is not in acute distress.      Appearance: She is not ill-appearing. HENT:      Right Ear: Tympanic membrane and ear canal normal.      Left Ear: Tympanic membrane and ear canal normal.      Nose: Nose normal.      Mouth/Throat:      Pharynx: No oropharyngeal exudate or posterior oropharyngeal erythema. Eyes:      General:         Right eye: No discharge. Left eye: No discharge. Conjunctiva/sclera: Conjunctivae normal.   Pulmonary:      Effort: Pulmonary effort is normal. No respiratory distress. Breath sounds: Normal breath sounds. No wheezing, rhonchi or rales. Musculoskeletal:      Cervical back: Neck supple. Lymphadenopathy:      Cervical: No cervical adenopathy. Skin:     Findings: No rash. MDM    Procedures             ICD-10-CM ICD-9-CM    1. Nasal congestion  R09.81 478.19 POCT COVID-19, SARS-COV-2, PCR      AMB POC MEAGAN INFLUENZA A/B TEST      2. Nonintractable headache, unspecified chronicity pattern, unspecified headache type  R51.9 784.0 POCT COVID-19, SARS-COV-2, PCR      AMB POC MEAGAN INFLUENZA A/B TEST      3. COVID-19 virus infection  U07.1 079.89 nirmatrelvir-ritonavir (Paxlovid, EUA,) 300 mg (150 mg x 2)-100 mg        Medications Ordered Today   Medications    nirmatrelvir-ritonavir (Paxlovid, EUA,) 300 mg (150 mg x 2)-100 mg     Sig: Use as directed  Indications: COVID-19 (emergency use authorization)     Dispense:  1 Box     Refill:  0     Order Specific Question:   Does this patient qualify for COVID-19 antiviral treatment based on criteria for treatment? Answer:   Yes     Results for orders placed or performed in visit on 12/26/22   POCT COVID-19, SARS-COV-2, PCR   Result Value Ref Range    SARS-COV-2 PCR, POC Positive (A) Negative   AMB POC MEAGAN INFLUENZA A/B TEST   Result Value Ref Range    VALID INTERNAL CONTROL POC Yes     Influenza A Ag POC Negative Negative    Influenza B Ag POC Negative Negative     The patients condition was discussed with the patient and they understand.   The patient is to follow up with primary care doctor. If signs and symptoms become worse the pt is to go to the ER. The patient is to take medications as prescribed.

## 2023-01-01 DIAGNOSIS — E78.00 ELEVATED CHOLESTEROL: ICD-10-CM

## 2023-01-01 RX ORDER — ATORVASTATIN CALCIUM 40 MG/1
TABLET, FILM COATED ORAL
Qty: 90 TABLET | Refills: 1 | Status: SHIPPED | OUTPATIENT
Start: 2023-01-01

## 2023-05-01 ENCOUNTER — OFFICE VISIT (OUTPATIENT)
Dept: INTERNAL MEDICINE CLINIC | Age: 63
End: 2023-05-01
Payer: COMMERCIAL

## 2023-05-01 VITALS
TEMPERATURE: 97.8 F | HEIGHT: 62 IN | RESPIRATION RATE: 18 BRPM | OXYGEN SATURATION: 95 % | WEIGHT: 216.3 LBS | HEART RATE: 88 BPM | SYSTOLIC BLOOD PRESSURE: 105 MMHG | DIASTOLIC BLOOD PRESSURE: 72 MMHG | BODY MASS INDEX: 39.8 KG/M2

## 2023-05-01 DIAGNOSIS — R00.2 HEART PALPITATIONS: Primary | ICD-10-CM

## 2023-05-01 DIAGNOSIS — M67.40 GANGLION CYST: ICD-10-CM

## 2023-05-01 DIAGNOSIS — E66.01 SEVERE OBESITY (BMI 35.0-39.9) WITH COMORBIDITY (HCC): ICD-10-CM

## 2023-05-01 DIAGNOSIS — I10 HTN (HYPERTENSION), BENIGN: ICD-10-CM

## 2023-05-01 DIAGNOSIS — E78.00 ELEVATED CHOLESTEROL: ICD-10-CM

## 2023-05-01 DIAGNOSIS — K57.30 DIVERTICULOSIS OF SIGMOID COLON: ICD-10-CM

## 2023-05-01 DIAGNOSIS — R73.02 GLUCOSE INTOLERANCE (IMPAIRED GLUCOSE TOLERANCE): ICD-10-CM

## 2023-05-01 PROCEDURE — 3074F SYST BP LT 130 MM HG: CPT | Performed by: NURSE PRACTITIONER

## 2023-05-01 PROCEDURE — 93000 ELECTROCARDIOGRAM COMPLETE: CPT | Performed by: NURSE PRACTITIONER

## 2023-05-01 PROCEDURE — 3078F DIAST BP <80 MM HG: CPT | Performed by: NURSE PRACTITIONER

## 2023-05-01 PROCEDURE — 99204 OFFICE O/P NEW MOD 45 MIN: CPT | Performed by: NURSE PRACTITIONER

## 2023-05-01 NOTE — PROGRESS NOTES
Lola Funes (: 1960) is a 61 y.o. female here for evaluation of the following chief complaint(s):  Hypertension, Cholesterol Problem, Vitamin D Deficiency, Obesity, and Abnormal White Blood Cell Count (New to provider)         SUBJECTIVE/OBJECTIVE:  HPI    Ms. Coelho here today to establish with new office and new provider. She has concerns of right hand tendon cyst.   She denies CP, heart palpations, dyspnea, LE edema. Allergies   Allergen Reactions    Latex Itching     ITCHING, SWELLING       Current Outpatient Medications   Medication Sig Dispense Refill    atorvastatin (LIPITOR) 40 mg tablet TAKE 1 TABLET BY MOUTH EVERY DAY 90 Tablet 1    Pennsaid 20 mg/gram /actuation(2 %) sopm       lisinopril-hydroCHLOROthiazide (PRINZIDE, ZESTORETIC) 20-12.5 mg per tablet TAKE 1 TABLET BY MOUTH EVERY DAY 90 Tablet 1    cholecalciferol (VITAMIN D3) 25 mcg (1,000 unit) cap TAKE 1 CAPSULE BY MOUTH EVERY DAY 90 Capsule 2    magnesium 200 mg tab Take one pill one hour before sleep 30 Tablet 2    MULTI-VITAMIN PO Take 1 Tab by mouth daily.  ONE A DAY VITAMIN          Past Medical History:   Diagnosis Date    Adrenal nodule ([facility] [address] 2014    NO TREATMENT NEEDED    Adverse effect of anesthesia 10/03/2016    \"TEND TO TAKE A LITTLE LONGER FOR EFFECT TO WEAR OFF\"    Adverse effect of anesthesia     NO KNOWN HISTORY OF ANESTHESIA PROBLEMS IN FAMILY    Arthritis     Bursitis of left hip     Chronic pain     HIP, KNEES    Common migraine 2010    PT DENIES THIS DIAGNOSIS ON 10/3/16    CRP elevated 2010    Diabetes ([facility] [address]     borderline    Diverticulosis of sigmoid 2010    HAS HAD 2-3 EPISODES OF DIVERTICULITIS, PT STATES ON 10/3/16    Eczema 2010    Elevated cholesterol 2010    elevated particle number    Family history of early CAD 2010    Fe deficiency anemia 2010    H/O mammogram 2012    [facility]    HTN (hypertension) 2010    Leukopenia     Vitamin D deficiency 8/6/2010     Past Surgical History:   Procedure Laterality Date    ENDOSCOPY, COLON, DIAGNOSTIC  2/25/11    HX ACL RECONSTRUCTION Right 10/2016    HX CHOLECYSTECTOMY  11/01    laparoscopic    HX HERNIA REPAIR  3722    UMBILICAL    HX HYSTERECTOMY  8/98    ovaries spared    HX ORTHOPAEDIC Right 2003    BUNIONECTOMY    HX ORTHOPAEDIC Left 02/2016    TOTAL HIP     Social History     Socioeconomic History    Marital status:      Spouse name: Not on file    Number of children: 2    Years of education: Not on file    Highest education level: Not on file   Occupational History    Occupation: Clerical   Tobacco Use    Smoking status: Never    Smokeless tobacco: Never   Vaping Use    Vaping Use: Never used   Substance and Sexual Activity    Alcohol use: Yes     Comment: rare    Drug use: No    Sexual activity: Yes     Partners: Male     Birth control/protection: None   Other Topics Concern    Not on file   Social History Narrative    Not on file     Social Determinants of Health     Financial Resource Strain: Not on file   Food Insecurity: Not on file   Transportation Needs: Not on file   Physical Activity: Not on file   Stress: Not on file   Social Connections: Not on file   Intimate Partner Violence: Not on file   Housing Stability: Not on file      Family History   Problem Relation Age of Onset    Colon Cancer Paternal Grandmother     Cancer Paternal Grandmother     Colon Cancer Paternal Grandfather     OSTEOARTHRITIS Mother     Diabetes Mother     Elevated Lipids Mother     Hypertension Mother     Diabetes Father     OSTEOARTHRITIS Father     Anesth Problems Neg Hx        Review of Systems   Constitutional:  Negative for activity change, appetite change, diaphoresis, fatigue, fever and unexpected weight change. HENT:  Negative for congestion, ear pain, facial swelling, hearing loss, postnasal drip, rhinorrhea, sinus pressure, sinus pain, sneezing, sore throat, tinnitus, trouble swallowing and voice change. Eyes:  Negative for photophobia, pain, redness and visual disturbance. Respiratory:  Negative for apnea, cough, chest tightness, shortness of breath and wheezing. Cardiovascular:  Negative for chest pain, palpitations and leg swelling. Gastrointestinal:  Negative for abdominal distention, abdominal pain, blood in stool, constipation, diarrhea, nausea, rectal pain and vomiting. Endocrine: Negative for cold intolerance, heat intolerance, polydipsia, polyphagia and polyuria. Genitourinary:  Negative for decreased urine volume, difficulty urinating, dyspareunia, dysuria, flank pain, frequency, hematuria, menstrual problem, pelvic pain, urgency, vaginal bleeding, vaginal discharge and vaginal pain. Musculoskeletal:  Negative for arthralgias, back pain, joint swelling, myalgias, neck pain and neck stiffness. Skin:  Negative for color change and rash. Right hand cyst   Allergic/Immunologic: Negative for environmental allergies and food allergies. Neurological:  Negative for dizziness, tremors, syncope, weakness, light-headedness, numbness and headaches. Hematological:  Negative for adenopathy. Does not bruise/bleed easily. Psychiatric/Behavioral:  Negative for confusion, hallucinations, self-injury, sleep disturbance and suicidal ideas. The patient is not nervous/anxious. /72 (BP 1 Location: Left arm, BP Patient Position: Sitting)   Pulse 88   Temp 97.8 °F (36.6 °C)   Resp 18   Ht 5' 2\" (1.575 m)   Wt 216 lb 4.8 oz (98.1 kg)   SpO2 95%   BMI 39.56 kg/m²    No LMP recorded. Patient has had a hysterectomy. Physical Exam  Constitutional:       Appearance: Normal appearance. She is obese. HENT:      Head: Normocephalic and atraumatic. Nose: Nose normal.      Mouth/Throat:      Mouth: Mucous membranes are moist.      Pharynx: Oropharynx is clear. Eyes:      Extraocular Movements: Extraocular movements intact.       Conjunctiva/sclera: Conjunctivae normal.      Pupils: Pupils are equal, round, and reactive to light. Cardiovascular:      Rate and Rhythm: Normal rate and regular rhythm. Pulses: Normal pulses. Heart sounds: Normal heart sounds. Comments: Skipped beat  Pulmonary:      Effort: Pulmonary effort is normal.      Breath sounds: Normal breath sounds. Abdominal:      General: Abdomen is flat. Bowel sounds are normal.      Palpations: Abdomen is soft. Musculoskeletal:         General: Normal range of motion. Cervical back: Normal range of motion and neck supple. Skin:     General: Skin is warm and dry. Capillary Refill: Capillary refill takes less than 2 seconds. Neurological:      General: No focal deficit present. Mental Status: She is alert and oriented to person, place, and time. Mental status is at baseline. Psychiatric:         Mood and Affect: Mood normal.         Behavior: Behavior normal.         Thought Content: Thought content normal.         Judgment: Judgment normal.       ASSESSMENT/PLAN:  Below is the assessment and plan developed based on review of pertinent history, physical exam, labs, studies, and medications. 1. Heart palpitations  -     AMB POC EKG ROUTINE W/ 12 LEADS, INTER & REP  -     ECHO ADULT COMPLETE; Future  2. Severe obesity (BMI 35.0-39. 9) with comorbidity ([facility] [address]  3. Diverticulosis of sigmoid colon  4. Elevated cholesterol  5. Glucose intolerance (impaired glucose tolerance)  -     HEMOGLOBIN A1C WITH EAG; Future  6. HTN (hypertension), benign  -     CBC WITH AUTOMATED DIFF; Future  -     LIPID PANEL; Future  -     METABOLIC PANEL, COMPREHENSIVE; Future  -     TSH 3RD GENERATION; Future  -     URINALYSIS W/ RFLX MICROSCOPIC; Future  7. Ganglion cyst  -     REFERRAL TO ORTHOPEDICS    EKG in office SR-  she has no symptoms. Get baseline echocardiogram.  Discussed healthy eating, weight loss, exercise. Goal is 5lb weight loss in 3 months. No symptoms currently  7.   Right posterior wrist-  tendon cyst. Refer to ortho    RTO for fasting labs, RTO in 3 months, sooner if needed. No results found for this visit on 05/01/23. No follow-ups on file. An electronic signature was used to authenticate this note.   -- JUSTINE Garcia

## 2023-05-01 NOTE — PROGRESS NOTES
Chief Complaint   Patient presents with    Hypertension    Cholesterol Problem    Vitamin D Deficiency    Obesity    Abnormal White Blood Cell Count     New to provider    1. Have you been to the ER, urgent care clinic since your last visit? Hospitalized since your last visit?no    2. Have you seen or consulted any other health care providers outside of the [address] [place] [address] since your last visit? Include any pap smears or colon screening.  no

## 2023-05-02 ENCOUNTER — APPOINTMENT (OUTPATIENT)
Dept: INTERNAL MEDICINE CLINIC | Age: 63
End: 2023-05-02

## 2023-05-02 DIAGNOSIS — R73.02 GLUCOSE INTOLERANCE (IMPAIRED GLUCOSE TOLERANCE): ICD-10-CM

## 2023-05-02 DIAGNOSIS — I10 HTN (HYPERTENSION), BENIGN: ICD-10-CM

## 2023-05-03 ENCOUNTER — TRANSCRIBE ORDERS (OUTPATIENT)
Facility: HOSPITAL | Age: 63
End: 2023-05-03

## 2023-05-03 DIAGNOSIS — R00.2 HEART PALPITATIONS: Primary | ICD-10-CM

## 2023-05-03 LAB
ALBUMIN SERPL-MCNC: 3.7 G/DL (ref 3.5–5)
ALBUMIN/GLOB SERPL: 1.2 (ref 1.1–2.2)
ALP SERPL-CCNC: 80 U/L (ref 45–117)
ALT SERPL-CCNC: 32 U/L (ref 12–78)
ANION GAP SERPL CALC-SCNC: 5 MMOL/L (ref 5–15)
APPEARANCE UR: CLEAR
AST SERPL-CCNC: 21 U/L (ref 15–37)
BASOPHILS # BLD: 0 K/UL (ref 0–0.1)
BASOPHILS NFR BLD: 1 % (ref 0–1)
BILIRUB SERPL-MCNC: 0.5 MG/DL (ref 0.2–1)
BILIRUB UR QL: NEGATIVE
BUN SERPL-MCNC: 12 MG/DL (ref 6–20)
BUN/CREAT SERPL: 21 (ref 12–20)
CALCIUM SERPL-MCNC: 8.8 MG/DL (ref 8.5–10.1)
CHLORIDE SERPL-SCNC: 107 MMOL/L (ref 97–108)
CHOLEST SERPL-MCNC: 118 MG/DL
CO2 SERPL-SCNC: 28 MMOL/L (ref 21–32)
COLOR UR: NORMAL
CREAT SERPL-MCNC: 0.58 MG/DL (ref 0.55–1.02)
DIFFERENTIAL METHOD BLD: ABNORMAL
EOSINOPHIL # BLD: 0.1 K/UL (ref 0–0.4)
EOSINOPHIL NFR BLD: 4 % (ref 0–7)
ERYTHROCYTE [DISTWIDTH] IN BLOOD BY AUTOMATED COUNT: 14.3 % (ref 11.5–14.5)
EST. AVERAGE GLUCOSE BLD GHB EST-MCNC: 134 MG/DL
GLOBULIN SER CALC-MCNC: 3.2 G/DL (ref 2–4)
GLUCOSE SERPL-MCNC: 112 MG/DL (ref 65–100)
GLUCOSE UR STRIP.AUTO-MCNC: NEGATIVE MG/DL
HBA1C MFR BLD: 6.3 % (ref 4–5.6)
HCT VFR BLD AUTO: 40.1 % (ref 35–47)
HDLC SERPL-MCNC: 50 MG/DL
HDLC SERPL: 2.4 (ref 0–5)
HGB BLD-MCNC: 12.5 G/DL (ref 11.5–16)
HGB UR QL STRIP: NEGATIVE
IMM GRANULOCYTES # BLD AUTO: 0 K/UL (ref 0–0.04)
IMM GRANULOCYTES NFR BLD AUTO: 0 % (ref 0–0.5)
KETONES UR QL STRIP.AUTO: NEGATIVE MG/DL
LDLC SERPL CALC-MCNC: 40.2 MG/DL (ref 0–100)
LEUKOCYTE ESTERASE UR QL STRIP.AUTO: NEGATIVE
LYMPHOCYTES # BLD: 1.2 K/UL (ref 0.8–3.5)
LYMPHOCYTES NFR BLD: 36 % (ref 12–49)
MCH RBC QN AUTO: 29.1 PG (ref 26–34)
MCHC RBC AUTO-ENTMCNC: 31.2 G/DL (ref 30–36.5)
MCV RBC AUTO: 93.3 FL (ref 80–99)
MONOCYTES # BLD: 0.4 K/UL (ref 0–1)
MONOCYTES NFR BLD: 11 % (ref 5–13)
NEUTS SEG # BLD: 1.6 K/UL (ref 1.8–8)
NEUTS SEG NFR BLD: 48 % (ref 32–75)
NITRITE UR QL STRIP.AUTO: NEGATIVE
NRBC # BLD: 0 K/UL (ref 0–0.01)
NRBC BLD-RTO: 0 PER 100 WBC
PH UR STRIP: 6 (ref 5–8)
PLATELET # BLD AUTO: 199 K/UL (ref 150–400)
PMV BLD AUTO: 10.3 FL (ref 8.9–12.9)
POTASSIUM SERPL-SCNC: 3.7 MMOL/L (ref 3.5–5.1)
PROT SERPL-MCNC: 6.9 G/DL (ref 6.4–8.2)
PROT UR STRIP-MCNC: NEGATIVE MG/DL
RBC # BLD AUTO: 4.3 M/UL (ref 3.8–5.2)
SODIUM SERPL-SCNC: 140 MMOL/L (ref 136–145)
SP GR UR REFRACTOMETRY: 1.02 (ref 1–1.03)
TRIGL SERPL-MCNC: 139 MG/DL (ref ?–150)
UROBILINOGEN UR QL STRIP.AUTO: 0.2 EU/DL (ref 0.2–1)
VLDLC SERPL CALC-MCNC: 27.8 MG/DL
WBC # BLD AUTO: 3.4 K/UL (ref 3.6–11)

## 2023-05-26 ENCOUNTER — HOSPITAL ENCOUNTER (OUTPATIENT)
Facility: HOSPITAL | Age: 63
Discharge: HOME | End: 2023-05-26
Payer: COMMERCIAL

## 2023-05-26 VITALS — WEIGHT: 219.8 LBS | BODY MASS INDEX: 40.45 KG/M2 | HEIGHT: 62 IN

## 2023-05-26 DIAGNOSIS — R00.2 HEART PALPITATIONS: ICD-10-CM

## 2023-05-26 LAB
ECHO AO ROOT DIAM: 3.3 CM
ECHO AO ROOT INDEX: 1.66 CM/M2
ECHO AV AREA PEAK VELOCITY: 2.3 CM2
ECHO AV AREA/BSA PEAK VELOCITY: 1.2 CM2/M2
ECHO AV PEAK GRADIENT: 4 MMHG
ECHO AV PEAK VELOCITY: 0.9 M/S
ECHO AV VELOCITY RATIO: 0.67
ECHO BSA: 2.09 M2
ECHO EST RA PRESSURE: 3 MMHG
ECHO LA DIAMETER INDEX: 1.06 CM/M2
ECHO LA DIAMETER: 2.1 CM
ECHO LA TO AORTIC ROOT RATIO: 0.64
ECHO LV E' LATERAL VELOCITY: 7 CM/S
ECHO LV E' SEPTAL VELOCITY: 6 CM/S
ECHO LV FRACTIONAL SHORTENING: 46 % (ref 28–44)
ECHO LV INTERNAL DIMENSION DIASTOLE INDEX: 2.06 CM/M2
ECHO LV INTERNAL DIMENSION DIASTOLIC: 4.1 CM (ref 3.9–5.3)
ECHO LV INTERNAL DIMENSION SYSTOLIC INDEX: 1.11 CM/M2
ECHO LV INTERNAL DIMENSION SYSTOLIC: 2.2 CM
ECHO LV IVSD: 1 CM (ref 0.6–0.9)
ECHO LV MASS 2D: 132.1 G (ref 67–162)
ECHO LV MASS INDEX 2D: 66.4 G/M2 (ref 43–95)
ECHO LV POSTERIOR WALL DIASTOLIC: 1 CM (ref 0.6–0.9)
ECHO LV RELATIVE WALL THICKNESS RATIO: 0.49
ECHO LVOT AREA: 3.5 CM2
ECHO LVOT DIAM: 2.1 CM
ECHO LVOT PEAK GRADIENT: 2 MMHG
ECHO LVOT PEAK VELOCITY: 0.6 M/S
ECHO MV A VELOCITY: 0.34 M/S
ECHO MV E DECELERATION TIME (DT): 207.7 MS
ECHO MV E VELOCITY: 0.62 M/S
ECHO MV E/A RATIO: 1.82
ECHO MV E/E' LATERAL: 8.86
ECHO MV E/E' RATIO (AVERAGED): 9.6
ECHO MV E/E' SEPTAL: 10.33
ECHO RIGHT VENTRICULAR SYSTOLIC PRESSURE (RVSP): 23 MMHG
ECHO RV FREE WALL PEAK S': 11 CM/S
ECHO RV TAPSE: 2 CM (ref 1.7–?)
ECHO TV REGURGITANT MAX VELOCITY: 2.24 M/S
ECHO TV REGURGITANT PEAK GRADIENT: 20 MMHG

## 2023-05-26 PROCEDURE — 93306 TTE W/DOPPLER COMPLETE: CPT

## 2023-05-26 PROCEDURE — 93306 TTE W/DOPPLER COMPLETE: CPT | Performed by: INTERNAL MEDICINE

## 2023-06-14 RX ORDER — ATORVASTATIN CALCIUM 40 MG/1
40 TABLET, FILM COATED ORAL DAILY
Qty: 90 TABLET | Refills: 1 | Status: SHIPPED | OUTPATIENT
Start: 2023-06-14

## 2023-08-15 NOTE — TELEPHONE ENCOUNTER
lisinopril-hydroCHLOROthiazide (PRINZIDE, ZESTORETIC) 20-12.5 mg per tablet 90 Tablet 1 9/27/2022     Sig: TAKE 1 TABLET BY MOUTH EVERY DAY      Last visit: 5/1/23   Future visit: 9/1/23    Pharmacy:  [facility]    Patient states she is out of this medication

## 2023-08-16 RX ORDER — LISINOPRIL AND HYDROCHLOROTHIAZIDE 20; 12.5 MG/1; MG/1
1 TABLET ORAL DAILY
Qty: 90 TABLET | Refills: 1 | Status: SHIPPED | OUTPATIENT
Start: 2023-08-16

## 2023-08-29 SDOH — ECONOMIC STABILITY: TRANSPORTATION INSECURITY
IN THE PAST 12 MONTHS, HAS LACK OF TRANSPORTATION KEPT YOU FROM MEETINGS, WORK, OR FROM GETTING THINGS NEEDED FOR DAILY LIVING?: NO

## 2023-08-29 SDOH — ECONOMIC STABILITY: FOOD INSECURITY: WITHIN THE PAST 12 MONTHS, THE FOOD YOU BOUGHT JUST DIDN'T LAST AND YOU DIDN'T HAVE MONEY TO GET MORE.: PATIENT DECLINED

## 2023-08-29 SDOH — ECONOMIC STABILITY: INCOME INSECURITY: HOW HARD IS IT FOR YOU TO PAY FOR THE VERY BASICS LIKE FOOD, HOUSING, MEDICAL CARE, AND HEATING?: SOMEWHAT HARD

## 2023-08-29 SDOH — ECONOMIC STABILITY: HOUSING INSECURITY
IN THE LAST 12 MONTHS, WAS THERE A TIME WHEN YOU DID NOT HAVE A STEADY PLACE TO SLEEP OR SLEPT IN A SHELTER (INCLUDING NOW)?: NO

## 2023-08-29 SDOH — ECONOMIC STABILITY: FOOD INSECURITY: WITHIN THE PAST 12 MONTHS, YOU WORRIED THAT YOUR FOOD WOULD RUN OUT BEFORE YOU GOT MONEY TO BUY MORE.: PATIENT DECLINED

## 2023-09-20 ENCOUNTER — OFFICE VISIT (OUTPATIENT)
Facility: CLINIC | Age: 63
End: 2023-09-20
Payer: COMMERCIAL

## 2023-09-20 VITALS
SYSTOLIC BLOOD PRESSURE: 120 MMHG | WEIGHT: 218.4 LBS | TEMPERATURE: 98 F | BODY MASS INDEX: 40.19 KG/M2 | DIASTOLIC BLOOD PRESSURE: 80 MMHG | HEART RATE: 68 BPM | HEIGHT: 62 IN | RESPIRATION RATE: 16 BRPM | OXYGEN SATURATION: 98 %

## 2023-09-20 DIAGNOSIS — L30.9 ECZEMA, UNSPECIFIED TYPE: ICD-10-CM

## 2023-09-20 DIAGNOSIS — R60.0 LEG EDEMA, LEFT: ICD-10-CM

## 2023-09-20 DIAGNOSIS — M67.40 GANGLION CYST: ICD-10-CM

## 2023-09-20 DIAGNOSIS — E55.9 VITAMIN D DEFICIENCY: ICD-10-CM

## 2023-09-20 DIAGNOSIS — I10 HTN (HYPERTENSION), BENIGN: ICD-10-CM

## 2023-09-20 DIAGNOSIS — R73.03 PREDIABETES: Primary | ICD-10-CM

## 2023-09-20 DIAGNOSIS — R00.2 HEART PALPITATIONS: ICD-10-CM

## 2023-09-20 DIAGNOSIS — Z23 ENCOUNTER FOR IMMUNIZATION: ICD-10-CM

## 2023-09-20 PROCEDURE — 90674 CCIIV4 VAC NO PRSV 0.5 ML IM: CPT | Performed by: NURSE PRACTITIONER

## 2023-09-20 PROCEDURE — 3079F DIAST BP 80-89 MM HG: CPT | Performed by: NURSE PRACTITIONER

## 2023-09-20 PROCEDURE — 90471 IMMUNIZATION ADMIN: CPT | Performed by: NURSE PRACTITIONER

## 2023-09-20 PROCEDURE — 99214 OFFICE O/P EST MOD 30 MIN: CPT | Performed by: NURSE PRACTITIONER

## 2023-09-20 PROCEDURE — 3074F SYST BP LT 130 MM HG: CPT | Performed by: NURSE PRACTITIONER

## 2023-09-20 RX ORDER — TRIAMCINOLONE ACETONIDE 0.25 MG/G
OINTMENT TOPICAL
Refills: 1 | Status: CANCELLED | OUTPATIENT
Start: 2023-09-20 | End: 2023-09-27

## 2023-09-20 RX ORDER — TRIAMCINOLONE ACETONIDE 1 MG/G
CREAM TOPICAL
Qty: 45 G | Refills: 1 | Status: SHIPPED | OUTPATIENT
Start: 2023-09-20

## 2023-09-20 RX ORDER — METFORMIN HYDROCHLORIDE 500 MG/1
500 TABLET, EXTENDED RELEASE ORAL
Qty: 90 TABLET | Refills: 1 | Status: SHIPPED | OUTPATIENT
Start: 2023-09-20

## 2023-09-20 ASSESSMENT — ENCOUNTER SYMPTOMS
EYE REDNESS: 0
SINUS PAIN: 0
NAUSEA: 0
SORE THROAT: 0
TROUBLE SWALLOWING: 0
PHOTOPHOBIA: 0
FACIAL SWELLING: 0
BLOOD IN STOOL: 0
EYE DISCHARGE: 0
COLOR CHANGE: 0
SHORTNESS OF BREATH: 0
APNEA: 0
CHOKING: 0
SINUS PRESSURE: 0
BACK PAIN: 0
COUGH: 0
EYE PAIN: 0
DIARRHEA: 0
ANAL BLEEDING: 0
ABDOMINAL PAIN: 0
CONSTIPATION: 0
VOMITING: 0
WHEEZING: 0
RECTAL PAIN: 0

## 2023-09-20 NOTE — PROGRESS NOTES
Chief Complaint   Patient presents with    Hypertension     3 month f/up    Palpitations    Eczema     1. Have you been to the ER, urgent care clinic since your last visit? Hospitalized since your last visit? No    2. Have you seen or consulted any other health care providers outside of the [address] [place] [address] Avenue since your last visit? Include any pap smears or colon screening.  No

## 2023-10-03 ENCOUNTER — HOSPITAL ENCOUNTER (OUTPATIENT)
Facility: HOSPITAL | Age: 63
Discharge: HOME | End: 2023-10-05
Payer: COMMERCIAL

## 2023-10-03 DIAGNOSIS — R00.2 HEART PALPITATIONS: ICD-10-CM

## 2023-10-03 PROCEDURE — 93270 REMOTE 30 DAY ECG REV/REPORT: CPT

## 2023-12-11 RX ORDER — ATORVASTATIN CALCIUM 40 MG/1
40 TABLET, FILM COATED ORAL DAILY
Qty: 90 TABLET | Refills: 1 | Status: SHIPPED | OUTPATIENT
Start: 2023-12-11

## 2023-12-11 NOTE — TELEPHONE ENCOUNTER
PCP: Devika Jeff, APRN - NP    Last appt: 9/20/2023  Future Appointments   Date Time Provider [address]   12/15/2023  8:40 AM LAB ONLY [facility] AMB       Requested Prescriptions     Pending Prescriptions Disp Refills    atorvastatin (LIPITOR) 40 MG tablet [Pharmacy Med Name: ATORVASTATIN 40 MG TABLET] 90 tablet 1     Sig: TAKE 1 TABLET BY MOUTH EVERY DAY       Prior labs and Blood pressures:  BP Readings from Last 3 Encounters:   09/20/23 120/80   05/01/23 105/72   12/26/22 135/83     Lab Results   Component Value Date/Time     05/02/2023 08:15 AM    K 3.7 05/02/2023 08:15 AM     05/02/2023 08:15 AM    CO2 28 05/02/2023 08:15 AM    BUN 12 05/02/2023 08:15 AM    GFRAA >60 09/02/2022 03:03 PM     No results found for: \"HBA1C\", \"[id number]\"  Lab Results   Component Value Date/Time    CHOL 118 05/02/2023 08:15 AM    HDL 50 05/02/2023 08:15 AM     No results found for: \"VITD3\", \"VD3RIA\"        Lab Results   Component Value Date/Time    TSH 1.56 10/12/2021 09:42 AM

## 2023-12-15 ENCOUNTER — NURSE ONLY (OUTPATIENT)
Facility: CLINIC | Age: 63
End: 2023-12-15

## 2023-12-15 DIAGNOSIS — E55.9 VITAMIN D DEFICIENCY: ICD-10-CM

## 2023-12-15 DIAGNOSIS — R73.03 PREDIABETES: ICD-10-CM

## 2023-12-15 DIAGNOSIS — I10 HTN (HYPERTENSION), BENIGN: ICD-10-CM

## 2023-12-16 LAB
25(OH)D3 SERPL-MCNC: 55.3 NG/ML (ref 30–100)
ALBUMIN SERPL-MCNC: 3.6 G/DL (ref 3.5–5)
ALBUMIN/GLOB SERPL: 1 (ref 1.1–2.2)
ALP SERPL-CCNC: 89 U/L (ref 45–117)
ALT SERPL-CCNC: 43 U/L (ref 12–78)
ANION GAP SERPL CALC-SCNC: 4 MMOL/L (ref 5–15)
APPEARANCE UR: CLEAR
AST SERPL-CCNC: 29 U/L (ref 15–37)
BACTERIA URNS QL MICRO: ABNORMAL /HPF
BASOPHILS # BLD: 0 K/UL (ref 0–0.1)
BASOPHILS NFR BLD: 1 % (ref 0–1)
BILIRUB SERPL-MCNC: 0.4 MG/DL (ref 0.2–1)
BILIRUB UR QL: NEGATIVE
BUN SERPL-MCNC: 15 MG/DL (ref 6–20)
BUN/CREAT SERPL: 23 (ref 12–20)
CALCIUM SERPL-MCNC: 8.7 MG/DL (ref 8.5–10.1)
CHLORIDE SERPL-SCNC: 109 MMOL/L (ref 97–108)
CHOLEST SERPL-MCNC: 121 MG/DL
CO2 SERPL-SCNC: 29 MMOL/L (ref 21–32)
COLOR UR: ABNORMAL
CREAT SERPL-MCNC: 0.64 MG/DL (ref 0.55–1.02)
DIFFERENTIAL METHOD BLD: ABNORMAL
EOSINOPHIL # BLD: 0.1 K/UL (ref 0–0.4)
EOSINOPHIL NFR BLD: 3 % (ref 0–7)
EPITH CASTS URNS QL MICRO: ABNORMAL /LPF
ERYTHROCYTE [DISTWIDTH] IN BLOOD BY AUTOMATED COUNT: 13.6 % (ref 11.5–14.5)
EST. AVERAGE GLUCOSE BLD GHB EST-MCNC: 128 MG/DL
GLOBULIN SER CALC-MCNC: 3.7 G/DL (ref 2–4)
GLUCOSE SERPL-MCNC: 129 MG/DL (ref 65–100)
GLUCOSE UR STRIP.AUTO-MCNC: NEGATIVE MG/DL
HBA1C MFR BLD: 6.1 % (ref 4–5.6)
HCT VFR BLD AUTO: 39 % (ref 35–47)
HDLC SERPL-MCNC: 50 MG/DL
HDLC SERPL: 2.4 (ref 0–5)
HGB BLD-MCNC: 12.4 G/DL (ref 11.5–16)
HGB UR QL STRIP: NEGATIVE
HYALINE CASTS URNS QL MICRO: ABNORMAL /LPF (ref 0–5)
IMM GRANULOCYTES # BLD AUTO: 0 K/UL (ref 0–0.04)
IMM GRANULOCYTES NFR BLD AUTO: 0 % (ref 0–0.5)
KETONES UR QL STRIP.AUTO: NEGATIVE MG/DL
LDLC SERPL CALC-MCNC: 55.2 MG/DL (ref 0–100)
LEUKOCYTE ESTERASE UR QL STRIP.AUTO: NEGATIVE
LYMPHOCYTES # BLD: 1.5 K/UL (ref 0.8–3.5)
LYMPHOCYTES NFR BLD: 47 % (ref 12–49)
MCH RBC QN AUTO: 28.5 PG (ref 26–34)
MCHC RBC AUTO-ENTMCNC: 31.8 G/DL (ref 30–36.5)
MCV RBC AUTO: 89.7 FL (ref 80–99)
MONOCYTES # BLD: 0.3 K/UL (ref 0–1)
MONOCYTES NFR BLD: 10 % (ref 5–13)
NEUTS SEG # BLD: 1.2 K/UL (ref 1.8–8)
NEUTS SEG NFR BLD: 39 % (ref 32–75)
NITRITE UR QL STRIP.AUTO: NEGATIVE
NRBC # BLD: 0 K/UL (ref 0–0.01)
NRBC BLD-RTO: 0 PER 100 WBC
PH UR STRIP: 6 (ref 5–8)
PLATELET # BLD AUTO: 242 K/UL (ref 150–400)
PMV BLD AUTO: 10.4 FL (ref 8.9–12.9)
POTASSIUM SERPL-SCNC: 3.8 MMOL/L (ref 3.5–5.1)
PROT SERPL-MCNC: 7.3 G/DL (ref 6.4–8.2)
PROT UR STRIP-MCNC: NEGATIVE MG/DL
RBC # BLD AUTO: 4.35 M/UL (ref 3.8–5.2)
RBC #/AREA URNS HPF: ABNORMAL /HPF (ref 0–5)
SODIUM SERPL-SCNC: 142 MMOL/L (ref 136–145)
SP GR UR REFRACTOMETRY: 1.02 (ref 1–1.03)
TRIGL SERPL-MCNC: 79 MG/DL
URINE CULTURE IF INDICATED: ABNORMAL
UROBILINOGEN UR QL STRIP.AUTO: 1 EU/DL (ref 0.2–1)
VLDLC SERPL CALC-MCNC: 15.8 MG/DL
WBC # BLD AUTO: 3.1 K/UL (ref 3.6–11)
WBC URNS QL MICRO: ABNORMAL /HPF (ref 0–4)

## 2024-02-14 RX ORDER — LISINOPRIL AND HYDROCHLOROTHIAZIDE 20; 12.5 MG/1; MG/1
1 TABLET ORAL DAILY
Qty: 90 TABLET | Refills: 3 | Status: SHIPPED | OUTPATIENT
Start: 2024-02-14

## 2024-02-14 NOTE — TELEPHONE ENCOUNTER
PCP: Bobby Bernal APRN - NP    Last appt: 9/20/2023  Future Appointments   Date Time Provider Department Center   2/15/2024 10:30 AM Bobby Bernal APRN - NP [facility]       Requested Prescriptions     Pending Prescriptions Disp Refills    lisinopril-hydroCHLOROthiazide (PRINZIDE;ZESTORETIC) 20-12.5 MG per tablet [Pharmacy Med Name: LISINOPRIL-HCTZ 20-12.5 MG TAB] 90 tablet 1     Sig: TAKE 1 TABLET BY MOUTH EVERY DAY       Prior labs and Blood pressures:  BP Readings from Last 3 Encounters:   09/20/23 120/80   05/01/23 105/72   12/26/22 135/83     Lab Results   Component Value Date/Time     12/15/2023 08:56 AM    K 3.8 12/15/2023 08:56 AM     12/15/2023 08:56 AM    CO2 29 12/15/2023 08:56 AM    BUN 15 12/15/2023 08:56 AM    GFRAA >60 09/02/2022 03:03 PM     No results found for: \"HBA1C\", \"SBP1MHAR\"  Lab Results   Component Value Date/Time    CHOL 121 12/15/2023 08:56 AM    HDL 50 12/15/2023 08:56 AM     No results found for: \"VITD3\", \"VD3RIA\"        Lab Results   Component Value Date/Time    TSH 1.56 10/12/2021 09:42 AM

## 2024-07-09 NOTE — TELEPHONE ENCOUNTER
PCP: No primary care provider on file.    Last appt: Visit date not found    No future appointments.    Requested Prescriptions     Pending Prescriptions Disp Refills    atorvastatin (LIPITOR) 40 MG tablet 30 tablet 0     Sig: Take 1 tablet by mouth daily

## 2024-07-10 RX ORDER — ATORVASTATIN CALCIUM 40 MG/1
40 TABLET, FILM COATED ORAL DAILY
Qty: 30 TABLET | Refills: 0 | Status: SHIPPED | OUTPATIENT
Start: 2024-07-10

## 2024-07-14 ENCOUNTER — OFFICE VISIT (OUTPATIENT)
Age: 64
End: 2024-07-14

## 2024-07-14 VITALS
SYSTOLIC BLOOD PRESSURE: 132 MMHG | TEMPERATURE: 98.6 F | BODY MASS INDEX: 40.55 KG/M2 | HEART RATE: 96 BPM | HEIGHT: 61 IN | RESPIRATION RATE: 18 BRPM | OXYGEN SATURATION: 95 % | WEIGHT: 214.8 LBS | DIASTOLIC BLOOD PRESSURE: 81 MMHG

## 2024-07-14 DIAGNOSIS — R05.1 ACUTE COUGH: Primary | ICD-10-CM

## 2024-07-14 LAB
Lab: ABNORMAL
QC PASS/FAIL: ABNORMAL
SARS-COV-2 RDRP RESP QL NAA+PROBE: POSITIVE

## 2024-07-14 RX ORDER — NIRMATRELVIR AND RITONAVIR 150-100 MG
KIT ORAL
Qty: 20 TABLET | Refills: 0 | Status: SHIPPED | OUTPATIENT
Start: 2024-07-14 | End: 2024-07-19

## 2024-07-14 RX ORDER — BENZONATATE 200 MG/1
200 CAPSULE ORAL 3 TIMES DAILY PRN
Qty: 21 CAPSULE | Refills: 0 | Status: SHIPPED | OUTPATIENT
Start: 2024-07-14 | End: 2024-07-21

## 2024-07-14 RX ORDER — ACETAMINOPHEN 650 MG/1
650 SUPPOSITORY RECTAL EVERY 4 HOURS PRN
COMMUNITY